# Patient Record
Sex: MALE | Race: OTHER | NOT HISPANIC OR LATINO | URBAN - METROPOLITAN AREA
[De-identification: names, ages, dates, MRNs, and addresses within clinical notes are randomized per-mention and may not be internally consistent; named-entity substitution may affect disease eponyms.]

---

## 2017-11-06 ENCOUNTER — EMERGENCY (EMERGENCY)
Facility: HOSPITAL | Age: 37
LOS: 1 days | Discharge: HOME | End: 2017-11-06
Attending: EMERGENCY MEDICINE | Admitting: EMERGENCY MEDICINE
Payer: MEDICAID

## 2017-11-06 VITALS
DIASTOLIC BLOOD PRESSURE: 69 MMHG | RESPIRATION RATE: 18 BRPM | TEMPERATURE: 99 F | SYSTOLIC BLOOD PRESSURE: 107 MMHG | OXYGEN SATURATION: 99 % | HEART RATE: 61 BPM

## 2017-11-06 VITALS
TEMPERATURE: 97 F | HEART RATE: 75 BPM | SYSTOLIC BLOOD PRESSURE: 124 MMHG | WEIGHT: 119.93 LBS | OXYGEN SATURATION: 100 % | DIASTOLIC BLOOD PRESSURE: 70 MMHG | RESPIRATION RATE: 17 BRPM

## 2017-11-06 DIAGNOSIS — R11.0 NAUSEA: ICD-10-CM

## 2017-11-06 DIAGNOSIS — R10.11 RIGHT UPPER QUADRANT PAIN: ICD-10-CM

## 2017-11-06 DIAGNOSIS — R10.13 EPIGASTRIC PAIN: ICD-10-CM

## 2017-11-06 DIAGNOSIS — E78.5 HYPERLIPIDEMIA, UNSPECIFIED: ICD-10-CM

## 2017-11-06 LAB
ALBUMIN SERPL ELPH-MCNC: 4.5 G/DL — SIGNIFICANT CHANGE UP (ref 3.3–5)
ALP SERPL-CCNC: 39 U/L — LOW (ref 40–120)
ALT FLD-CCNC: 15 U/L — SIGNIFICANT CHANGE UP (ref 10–45)
ANION GAP SERPL CALC-SCNC: 14 MMOL/L — SIGNIFICANT CHANGE UP (ref 5–17)
AST SERPL-CCNC: 18 U/L — SIGNIFICANT CHANGE UP (ref 10–40)
BASOPHILS NFR BLD AUTO: 0.5 % — SIGNIFICANT CHANGE UP (ref 0–2)
BILIRUB SERPL-MCNC: <0.2 MG/DL — SIGNIFICANT CHANGE UP (ref 0.2–1.2)
BUN SERPL-MCNC: 16 MG/DL — SIGNIFICANT CHANGE UP (ref 7–23)
CALCIUM SERPL-MCNC: 9.7 MG/DL — SIGNIFICANT CHANGE UP (ref 8.4–10.5)
CHLORIDE SERPL-SCNC: 99 MMOL/L — SIGNIFICANT CHANGE UP (ref 96–108)
CO2 SERPL-SCNC: 26 MMOL/L — SIGNIFICANT CHANGE UP (ref 22–31)
CREAT SERPL-MCNC: 0.84 MG/DL — SIGNIFICANT CHANGE UP (ref 0.5–1.3)
EOSINOPHIL NFR BLD AUTO: 4.7 % — SIGNIFICANT CHANGE UP (ref 0–6)
GLUCOSE SERPL-MCNC: 104 MG/DL — HIGH (ref 70–99)
HCT VFR BLD CALC: 41.1 % — SIGNIFICANT CHANGE UP (ref 39–50)
HGB BLD-MCNC: 13.7 G/DL — SIGNIFICANT CHANGE UP (ref 13–17)
LIDOCAIN IGE QN: 50 U/L — SIGNIFICANT CHANGE UP (ref 7–60)
LYMPHOCYTES # BLD AUTO: 38 % — SIGNIFICANT CHANGE UP (ref 13–44)
MCHC RBC-ENTMCNC: 29 PG — SIGNIFICANT CHANGE UP (ref 27–34)
MCHC RBC-ENTMCNC: 33.3 G/DL — SIGNIFICANT CHANGE UP (ref 32–36)
MCV RBC AUTO: 86.9 FL — SIGNIFICANT CHANGE UP (ref 80–100)
MONOCYTES NFR BLD AUTO: 9.5 % — SIGNIFICANT CHANGE UP (ref 2–14)
NEUTROPHILS NFR BLD AUTO: 47.3 % — SIGNIFICANT CHANGE UP (ref 43–77)
PLATELET # BLD AUTO: 282 K/UL — SIGNIFICANT CHANGE UP (ref 150–400)
POTASSIUM SERPL-MCNC: 4.2 MMOL/L — SIGNIFICANT CHANGE UP (ref 3.5–5.3)
POTASSIUM SERPL-SCNC: 4.2 MMOL/L — SIGNIFICANT CHANGE UP (ref 3.5–5.3)
PROT SERPL-MCNC: 7.7 G/DL — SIGNIFICANT CHANGE UP (ref 6–8.3)
RBC # BLD: 4.73 M/UL — SIGNIFICANT CHANGE UP (ref 4.2–5.8)
RBC # FLD: 13.7 % — SIGNIFICANT CHANGE UP (ref 10.3–16.9)
SODIUM SERPL-SCNC: 139 MMOL/L — SIGNIFICANT CHANGE UP (ref 135–145)
WBC # BLD: 6.6 K/UL — SIGNIFICANT CHANGE UP (ref 3.8–10.5)
WBC # FLD AUTO: 6.6 K/UL — SIGNIFICANT CHANGE UP (ref 3.8–10.5)

## 2017-11-06 PROCEDURE — 99285 EMERGENCY DEPT VISIT HI MDM: CPT | Mod: 25

## 2017-11-06 PROCEDURE — 96375 TX/PRO/DX INJ NEW DRUG ADDON: CPT

## 2017-11-06 PROCEDURE — 76705 ECHO EXAM OF ABDOMEN: CPT | Mod: 26

## 2017-11-06 PROCEDURE — 74020: CPT | Mod: 26

## 2017-11-06 PROCEDURE — 74020: CPT

## 2017-11-06 PROCEDURE — 36415 COLL VENOUS BLD VENIPUNCTURE: CPT

## 2017-11-06 PROCEDURE — 99284 EMERGENCY DEPT VISIT MOD MDM: CPT | Mod: 25

## 2017-11-06 PROCEDURE — 93005 ELECTROCARDIOGRAM TRACING: CPT

## 2017-11-06 PROCEDURE — 76705 ECHO EXAM OF ABDOMEN: CPT

## 2017-11-06 PROCEDURE — 96374 THER/PROPH/DIAG INJ IV PUSH: CPT

## 2017-11-06 PROCEDURE — 93010 ELECTROCARDIOGRAM REPORT: CPT

## 2017-11-06 PROCEDURE — 83690 ASSAY OF LIPASE: CPT

## 2017-11-06 PROCEDURE — 80053 COMPREHEN METABOLIC PANEL: CPT

## 2017-11-06 PROCEDURE — 85025 COMPLETE CBC W/AUTO DIFF WBC: CPT

## 2017-11-06 RX ORDER — ONDANSETRON 8 MG/1
4 TABLET, FILM COATED ORAL ONCE
Qty: 0 | Refills: 0 | Status: COMPLETED | OUTPATIENT
Start: 2017-11-06 | End: 2017-11-06

## 2017-11-06 RX ORDER — OMEPRAZOLE 10 MG/1
1 CAPSULE, DELAYED RELEASE ORAL
Qty: 30 | Refills: 0
Start: 2017-11-06 | End: 2017-12-06

## 2017-11-06 RX ORDER — FAMOTIDINE 10 MG/ML
20 INJECTION INTRAVENOUS ONCE
Qty: 0 | Refills: 0 | Status: COMPLETED | OUTPATIENT
Start: 2017-11-06 | End: 2017-11-06

## 2017-11-06 RX ORDER — SODIUM CHLORIDE 9 MG/ML
1000 INJECTION INTRAMUSCULAR; INTRAVENOUS; SUBCUTANEOUS ONCE
Qty: 0 | Refills: 0 | Status: COMPLETED | OUTPATIENT
Start: 2017-11-06 | End: 2017-11-06

## 2017-11-06 RX ADMIN — FAMOTIDINE 20 MILLIGRAM(S): 10 INJECTION INTRAVENOUS at 20:00

## 2017-11-06 RX ADMIN — SODIUM CHLORIDE 1000 MILLILITER(S): 9 INJECTION INTRAMUSCULAR; INTRAVENOUS; SUBCUTANEOUS at 20:15

## 2017-11-06 RX ADMIN — Medication 30 MILLILITER(S): at 19:59

## 2017-11-06 RX ADMIN — ONDANSETRON 4 MILLIGRAM(S): 8 TABLET, FILM COATED ORAL at 19:59

## 2017-11-06 NOTE — ED PROVIDER NOTE - PHYSICAL EXAMINATION
VITAL SIGNS: I have reviewed nursing notes and confirm.  CONSTITUTIONAL: Well-developed; well-nourished; in no acute distress.  SKIN: Agree with RN documentation regarding decubitus evaluation. Remainder of skin exam is warm and dry, no acute rash.  HEAD: Normocephalic; atraumatic.  EYES: PERRL, EOM intact; conjunctiva and sclera clear.  ENT: No nasal discharge; airway clear.  NECK: Supple; non tender.  CARD: S1, S2 normal; no murmurs, gallops, or rubs. RRR  RESP: No wheezes, rales or rhonchi. CTA b/l  ABD: Normal bowel sounds; soft; non-distended; non-tender, no mcburney's pt TTP or jade's sign, no guarding/rigidity  EXT: Normal ROM. No clubbing, cyanosis or edema.  LYMPH: No acute cervical adenopathy.  NEURO: Alert, oriented. Grossly unremarkable.  PSYCH: Cooperative, appropriate.

## 2017-11-06 NOTE — ED PROVIDER NOTE - OBJECTIVE STATEMENT
35 y/o M w/HLD p/w intermittent sharp RUQ abd pain on/off for several days made worse with eating. States that pain is mostly epigastric/RUQ that radiates around to the back. + nausea, no vomiting. No CP/SOB/palpitations or cough. No c/d, no urinary sx. Denies hx abd surgery or known biliary pathology. Taking no meds at home for sx management.

## 2017-11-06 NOTE — ED ADULT NURSE NOTE - ADDITIONAL PRINTED INSTRUCTIONS GIVEN
Pt instructed to follow up with gastrologist and PCP within 2-3 days. iF sx worsen to come back to ed.

## 2017-11-06 NOTE — ED ADULT NURSE NOTE - CHPI ED SYMPTOMS NEG
no hematuria/no dysuria/no diarrhea/no abdominal distension/no blood in stool/no fever/no burning urination

## 2017-11-06 NOTE — ED ADULT NURSE REASSESSMENT NOTE - NS ED NURSE REASSESS COMMENT FT1
Pt returned from US. Pt A&Ox3 and denies pain at this time and pending discharge. Will continue to monitor.

## 2017-11-06 NOTE — ED PROVIDER NOTE - MEDICAL DECISION MAKING DETAILS
U/S negative for cholecysitis or cholelithiasis, no evidence of pancreatitis or biliary dz on labs, no leukocytosis or fever suggestive of infection, likely reflux/inflammatory pain which will require GI f/u and endoscopy. Starting on omeprazole, will f/u with both PMD and GI. Given strict return precautions and anticipatory guidance. Abd series showing non-specific gas pattern to rectum.

## 2017-11-06 NOTE — ED ADULT NURSE REASSESSMENT NOTE - NS ED NURSE REASSESS COMMENT FT1
Pt received from ER [facility]. Pt A&Ox3 at this time c.o 5:10 gas pressure in upper abdomen. Pt denies SOB, numbness or tingling to upper and lower extremities at this time. MD notified and pt instructed to pas gas if needed.  Pt pending US at this time and will continue to monitor.

## 2017-11-06 NOTE — ED ADULT NURSE NOTE - OBJECTIVE STATEMENT
Pt presents to ED with c/o sharp intermittent epigastric pain radiating to his back x 10 days and associated w nausea and vomiting, worse w eating. Pt denies fevers/chills/diarrhea or hx of abdom surgeries.

## 2017-12-20 PROBLEM — Z00.00 ENCOUNTER FOR PREVENTIVE HEALTH EXAMINATION: Status: ACTIVE | Noted: 2017-12-20

## 2017-12-21 ENCOUNTER — APPOINTMENT (OUTPATIENT)
Dept: GASTROENTEROLOGY | Facility: CLINIC | Age: 37
End: 2017-12-21
Payer: MEDICAID

## 2017-12-21 VITALS
DIASTOLIC BLOOD PRESSURE: 70 MMHG | BODY MASS INDEX: 22.08 KG/M2 | RESPIRATION RATE: 14 BRPM | HEIGHT: 62 IN | WEIGHT: 120 LBS | HEART RATE: 75 BPM | SYSTOLIC BLOOD PRESSURE: 108 MMHG | OXYGEN SATURATION: 97 % | TEMPERATURE: 98.1 F

## 2017-12-21 DIAGNOSIS — R10.9 UNSPECIFIED ABDOMINAL PAIN: ICD-10-CM

## 2017-12-21 DIAGNOSIS — R14.0 ABDOMINAL DISTENSION (GASEOUS): ICD-10-CM

## 2017-12-21 PROCEDURE — 99205 OFFICE O/P NEW HI 60 MIN: CPT | Mod: 25

## 2017-12-21 PROCEDURE — 36415 COLL VENOUS BLD VENIPUNCTURE: CPT

## 2017-12-21 RX ORDER — LORATADINE 10 MG/1
10 TABLET ORAL
Refills: 0 | Status: ACTIVE | COMMUNITY

## 2017-12-21 RX ORDER — MONTELUKAST SODIUM 10 MG/1
10 TABLET, FILM COATED ORAL
Refills: 0 | Status: ACTIVE | COMMUNITY

## 2017-12-21 RX ORDER — OMEPRAZOLE 20 MG/1
20 CAPSULE, DELAYED RELEASE ORAL
Refills: 0 | Status: ACTIVE | COMMUNITY

## 2017-12-27 LAB
ENDOMYSIUM IGA SER QL: NEGATIVE
ENDOMYSIUM IGA TITR SER: NORMAL
GLIADIN IGA SER QL: <5 UNITS
GLIADIN IGG SER QL: <5 UNITS
GLIADIN PEPTIDE IGA SER-ACNC: NEGATIVE
GLIADIN PEPTIDE IGG SER-ACNC: NEGATIVE
IGA SER QL IEP: 196 MG/DL
TTG IGA SER IA-ACNC: <5 UNITS
TTG IGA SER-ACNC: NEGATIVE
TTG IGG SER IA-ACNC: <5 UNITS
TTG IGG SER IA-ACNC: NEGATIVE

## 2018-01-22 ENCOUNTER — APPOINTMENT (OUTPATIENT)
Dept: GASTROENTEROLOGY | Facility: HOSPITAL | Age: 38
End: 2018-01-22

## 2018-01-26 ENCOUNTER — APPOINTMENT (OUTPATIENT)
Dept: GASTROENTEROLOGY | Facility: CLINIC | Age: 38
End: 2018-01-26

## 2018-02-13 ENCOUNTER — RESULT REVIEW (OUTPATIENT)
Age: 38
End: 2018-02-13

## 2018-02-13 ENCOUNTER — APPOINTMENT (OUTPATIENT)
Dept: GASTROENTEROLOGY | Facility: HOSPITAL | Age: 38
End: 2018-02-13
Payer: MEDICAID

## 2018-02-13 ENCOUNTER — OUTPATIENT (OUTPATIENT)
Dept: OUTPATIENT SERVICES | Facility: HOSPITAL | Age: 38
LOS: 1 days | Discharge: HOME | End: 2018-02-13
Payer: MEDICAID

## 2018-02-13 PROCEDURE — 43239 EGD BIOPSY SINGLE/MULTIPLE: CPT | Mod: 26

## 2018-02-13 PROCEDURE — 88305 TISSUE EXAM BY PATHOLOGIST: CPT

## 2018-02-13 PROCEDURE — 88341 IMHCHEM/IMCYTCHM EA ADD ANTB: CPT

## 2018-02-13 PROCEDURE — 43239 EGD BIOPSY SINGLE/MULTIPLE: CPT

## 2018-02-14 LAB — SURGICAL PATHOLOGY STUDY: SIGNIFICANT CHANGE UP

## 2018-02-22 ENCOUNTER — APPOINTMENT (OUTPATIENT)
Dept: GASTROENTEROLOGY | Facility: CLINIC | Age: 38
End: 2018-02-22

## 2018-03-12 ENCOUNTER — APPOINTMENT (OUTPATIENT)
Dept: GASTROENTEROLOGY | Facility: CLINIC | Age: 38
End: 2018-03-12

## 2018-03-16 ENCOUNTER — APPOINTMENT (OUTPATIENT)
Dept: GASTROENTEROLOGY | Facility: CLINIC | Age: 38
End: 2018-03-16

## 2019-06-17 ENCOUNTER — EMERGENCY (EMERGENCY)
Facility: HOSPITAL | Age: 39
LOS: 1 days | Discharge: HOME | End: 2019-06-17
Attending: EMERGENCY MEDICINE | Admitting: EMERGENCY MEDICINE
Payer: COMMERCIAL

## 2019-06-17 VITALS
HEART RATE: 88 BPM | SYSTOLIC BLOOD PRESSURE: 108 MMHG | TEMPERATURE: 98 F | DIASTOLIC BLOOD PRESSURE: 67 MMHG | OXYGEN SATURATION: 96 % | RESPIRATION RATE: 17 BRPM

## 2019-06-17 VITALS
RESPIRATION RATE: 16 BRPM | OXYGEN SATURATION: 99 % | SYSTOLIC BLOOD PRESSURE: 120 MMHG | TEMPERATURE: 98 F | HEART RATE: 72 BPM | DIASTOLIC BLOOD PRESSURE: 69 MMHG

## 2019-06-17 DIAGNOSIS — R50.9 FEVER, UNSPECIFIED: ICD-10-CM

## 2019-06-17 DIAGNOSIS — J18.1 LOBAR PNEUMONIA, UNSPECIFIED ORGANISM: ICD-10-CM

## 2019-06-17 LAB
ALBUMIN SERPL ELPH-MCNC: 4.2 G/DL — SIGNIFICANT CHANGE UP (ref 3.3–5)
ALP SERPL-CCNC: 43 U/L — SIGNIFICANT CHANGE UP (ref 40–120)
ALT FLD-CCNC: 18 U/L — SIGNIFICANT CHANGE UP (ref 10–45)
ANION GAP SERPL CALC-SCNC: 11 MMOL/L — SIGNIFICANT CHANGE UP (ref 5–17)
APPEARANCE UR: CLEAR — SIGNIFICANT CHANGE UP
AST SERPL-CCNC: 18 U/L — SIGNIFICANT CHANGE UP (ref 10–40)
BASOPHILS # BLD AUTO: 0.04 K/UL — SIGNIFICANT CHANGE UP (ref 0–0.2)
BASOPHILS NFR BLD AUTO: 0.5 % — SIGNIFICANT CHANGE UP (ref 0–2)
BILIRUB SERPL-MCNC: 0.2 MG/DL — SIGNIFICANT CHANGE UP (ref 0.2–1.2)
BILIRUB UR-MCNC: NEGATIVE — SIGNIFICANT CHANGE UP
BUN SERPL-MCNC: 14 MG/DL — SIGNIFICANT CHANGE UP (ref 7–23)
CALCIUM SERPL-MCNC: 9.2 MG/DL — SIGNIFICANT CHANGE UP (ref 8.4–10.5)
CHLORIDE SERPL-SCNC: 105 MMOL/L — SIGNIFICANT CHANGE UP (ref 96–108)
CO2 SERPL-SCNC: 27 MMOL/L — SIGNIFICANT CHANGE UP (ref 22–31)
COLOR SPEC: YELLOW — SIGNIFICANT CHANGE UP
CREAT SERPL-MCNC: 0.9 MG/DL — SIGNIFICANT CHANGE UP (ref 0.5–1.3)
DIFF PNL FLD: ABNORMAL
EOSINOPHIL # BLD AUTO: 0.25 K/UL — SIGNIFICANT CHANGE UP (ref 0–0.5)
EOSINOPHIL NFR BLD AUTO: 2.9 % — SIGNIFICANT CHANGE UP (ref 0–6)
GLUCOSE SERPL-MCNC: 102 MG/DL — HIGH (ref 70–99)
GLUCOSE UR QL: NEGATIVE — SIGNIFICANT CHANGE UP
HCT VFR BLD CALC: 38.4 % — LOW (ref 39–50)
HGB BLD-MCNC: 12.4 G/DL — LOW (ref 13–17)
IMM GRANULOCYTES NFR BLD AUTO: 0.2 % — SIGNIFICANT CHANGE UP (ref 0–1.5)
KETONES UR-MCNC: NEGATIVE — SIGNIFICANT CHANGE UP
LEUKOCYTE ESTERASE UR-ACNC: NEGATIVE — SIGNIFICANT CHANGE UP
LYMPHOCYTES # BLD AUTO: 2.06 K/UL — SIGNIFICANT CHANGE UP (ref 1–3.3)
LYMPHOCYTES # BLD AUTO: 24.1 % — SIGNIFICANT CHANGE UP (ref 13–44)
MCHC RBC-ENTMCNC: 29.3 PG — SIGNIFICANT CHANGE UP (ref 27–34)
MCHC RBC-ENTMCNC: 32.3 GM/DL — SIGNIFICANT CHANGE UP (ref 32–36)
MCV RBC AUTO: 90.8 FL — SIGNIFICANT CHANGE UP (ref 80–100)
MONOCYTES # BLD AUTO: 0.94 K/UL — HIGH (ref 0–0.9)
MONOCYTES NFR BLD AUTO: 11 % — SIGNIFICANT CHANGE UP (ref 2–14)
NEUTROPHILS # BLD AUTO: 5.23 K/UL — SIGNIFICANT CHANGE UP (ref 1.8–7.4)
NEUTROPHILS NFR BLD AUTO: 61.3 % — SIGNIFICANT CHANGE UP (ref 43–77)
NITRITE UR-MCNC: NEGATIVE — SIGNIFICANT CHANGE UP
NRBC # BLD: 0 /100 WBCS — SIGNIFICANT CHANGE UP (ref 0–0)
PH UR: 6 — SIGNIFICANT CHANGE UP (ref 5–8)
PLATELET # BLD AUTO: 239 K/UL — SIGNIFICANT CHANGE UP (ref 150–400)
POTASSIUM SERPL-MCNC: 4.1 MMOL/L — SIGNIFICANT CHANGE UP (ref 3.5–5.3)
POTASSIUM SERPL-SCNC: 4.1 MMOL/L — SIGNIFICANT CHANGE UP (ref 3.5–5.3)
PROT SERPL-MCNC: 7.2 G/DL — SIGNIFICANT CHANGE UP (ref 6–8.3)
PROT UR-MCNC: NEGATIVE MG/DL — SIGNIFICANT CHANGE UP
RBC # BLD: 4.23 M/UL — SIGNIFICANT CHANGE UP (ref 4.2–5.8)
RBC # FLD: 12.6 % — SIGNIFICANT CHANGE UP (ref 10.3–14.5)
SODIUM SERPL-SCNC: 143 MMOL/L — SIGNIFICANT CHANGE UP (ref 135–145)
SP GR SPEC: 1.02 — SIGNIFICANT CHANGE UP (ref 1–1.03)
UROBILINOGEN FLD QL: 0.2 E.U./DL — SIGNIFICANT CHANGE UP
WBC # BLD: 8.54 K/UL — SIGNIFICANT CHANGE UP (ref 3.8–10.5)
WBC # FLD AUTO: 8.54 K/UL — SIGNIFICANT CHANGE UP (ref 3.8–10.5)

## 2019-06-17 PROCEDURE — 80053 COMPREHEN METABOLIC PANEL: CPT

## 2019-06-17 PROCEDURE — 99283 EMERGENCY DEPT VISIT LOW MDM: CPT

## 2019-06-17 PROCEDURE — 99284 EMERGENCY DEPT VISIT MOD MDM: CPT | Mod: 25

## 2019-06-17 PROCEDURE — 71046 X-RAY EXAM CHEST 2 VIEWS: CPT

## 2019-06-17 PROCEDURE — 85025 COMPLETE CBC W/AUTO DIFF WBC: CPT

## 2019-06-17 PROCEDURE — 36415 COLL VENOUS BLD VENIPUNCTURE: CPT

## 2019-06-17 PROCEDURE — 81001 URINALYSIS AUTO W/SCOPE: CPT

## 2019-06-17 PROCEDURE — 71046 X-RAY EXAM CHEST 2 VIEWS: CPT | Mod: 26

## 2019-06-17 RX ORDER — ACETAMINOPHEN 500 MG
650 TABLET ORAL ONCE
Refills: 0 | Status: COMPLETED | OUTPATIENT
Start: 2019-06-17 | End: 2019-06-17

## 2019-06-17 RX ADMIN — Medication 650 MILLIGRAM(S): at 09:17

## 2019-06-17 NOTE — ED PROVIDER NOTE - DIAGNOSTIC INTERPRETATION
ER PA: Niki Gordon- reviewed with radiology  CHEST XRAY INTERPRETATION: right lower lobe infiltrate, heart shadow normal, bony structures intact

## 2019-06-17 NOTE — ED PROVIDER NOTE - NSFOLLOWUPINSTRUCTIONS_ED_ALL_ED_FT
please take antibiotics as prescribed    please follow up with your primary care doctor for re-evaluation      PNEUMONIA - AfterCare(R) Instructions(ER/ED)     Pneumonia    WHAT YOU NEED TO KNOW:    Pneumonia is an infection in your lungs caused by bacteria, viruses, fungi, or parasites. You can become infected if you come in contact with someone who is sick. You can get pneumonia if you recently had surgery or needed a ventilator to help you breathe. Pneumonia can also be caused by accidentally inhaling saliva or small pieces of food. Pneumonia may cause mild symptoms, or it can be severe and life-threatening. The Lungs         DISCHARGE INSTRUCTIONS:    Return to the emergency department if:     You cough up blood.       Your heart beats more than 100 beats in 1 minute.       You are very tired, confused, and cannot think clearly.      You have chest pain or trouble breathing.       Your lips or fingernails turn gray or blue.     Contact your healthcare provider if:     Your symptoms are the same or get worse 48 hours after you start antibiotics.      Your fever is not below 99°F (37.2°C) 48 hours after you start antibiotics.       You have a fever higher than 101°F (38.3°C).       You cannot eat, or you have loss of appetite, nausea, or are vomiting.      You have questions or concerns about your condition or care.    Medicines:     Antibiotics treat pneumonia caused by bacteria.      Acetaminophen decreases pain and fever. It is available without a doctor's order. Ask how much to take and how often to take it. Follow directions. Read the labels of all other medicines you are using to see if they also contain acetaminophen, or ask your doctor or pharmacist. Acetaminophen can cause liver damage if not taken correctly. Do not use more than 4 grams (4,000 milligrams) total of acetaminophen in one day.       NSAIDs, such as ibuprofen, help decrease swelling, pain, and fever. This medicine is available with or without a doctor's order. NSAIDs can cause stomach bleeding or kidney problems in certain people. If you take blood thinner medicine, always ask your healthcare provider if NSAIDs are safe for you. Always read the medicine label and follow directions.      Take your medicine as directed. Contact your healthcare provider if you think your medicine is not helping or if you have side effects. Tell him or her if you are allergic to any medicine. Keep a list of the medicines, vitamins, and herbs you take. Include the amounts, and when and why you take them. Bring the list or the pill bottles to follow-up visits. Carry your medicine list with you in case of an emergency.    Follow up with your healthcare provider as directed: You will need to return for more tests. Write down your questions so you remember to ask them during your visits.     Manage your symptoms:     Rest as needed. Rest often throughout the day. Alternate times of activity with times of rest.      Drink liquids as directed. Ask how much liquid to drink each day and which liquids are best for you. Liquids help thin your mucus, which may make it easier for you to cough it up.       Do not smoke. Avoid secondhand smoke. Smoking increases your risk for pneumonia. Smoking also makes it harder for you to get better after you have had pneumonia. Ask your healthcare provider for information if you need help to quit smoking.       Use a cool mist humidifier. A humidifier will help increase air moisture in your home. This may make it easier for you to breathe and help decrease your cough.       Keep your head elevated. You may be able to breathe better if you lie down with the head of your bed up.     Prevent pneumonia:     Prevent the spread of germs. Wash your hands often with soap and water. Use gel hand cleanser when there is no soap and water available. Do not touch your eyes, nose, or mouth unless you have washed your hands first. Cover your mouth when you cough. Cough into a tissue or your shirtsleeve so you do not spread germs from your hands. If you are sick, stay away from others as much as possible. Handwashing           Limit alcohol. Women should limit alcohol to 1 drink a day. Men should limit alcohol to 2 drinks a day. A drink of alcohol is 12 ounces of beer, 5 ounces of wine, or 1½ ounces of liquor.      Ask about vaccines. You may need a vaccine to help prevent pneumonia. Get an influenza (flu) vaccine every year as soon as it becomes available.          © Copyright [employer] 2019 All illustrations and images included in CareNotes are the copyrighted property of [employer] or [employer].      back to top                      © Copyright [employer] 2019 please take antibiotics as prescribed    continue tylenol and motrin as needed for fever    please follow up with your primary care doctor for re-evaluation/chest xray next week      Pneumonia    WHAT YOU NEED TO KNOW:    Pneumonia is an infection in your lungs caused by bacteria, viruses, fungi, or parasites. You can become infected if you come in contact with someone who is sick. You can get pneumonia if you recently had surgery or needed a ventilator to help you breathe. Pneumonia can also be caused by accidentally inhaling saliva or small pieces of food. Pneumonia may cause mild symptoms, or it can be severe and life-threatening. The Lungs         DISCHARGE INSTRUCTIONS:    Return to the emergency department if:     You cough up blood.       Your heart beats more than 100 beats in 1 minute.       You are very tired, confused, and cannot think clearly.      You have chest pain or trouble breathing.       Your lips or fingernails turn gray or blue.     Contact your healthcare provider if:     Your symptoms are the same or get worse 48 hours after you start antibiotics.      Your fever is not below 99°F (37.2°C) 48 hours after you start antibiotics.       You have a fever higher than 101°F (38.3°C).       You cannot eat, or you have loss of appetite, nausea, or are vomiting.      You have questions or concerns about your condition or care.    Medicines:     Antibiotics treat pneumonia caused by bacteria.      Acetaminophen decreases pain and fever. It is available without a doctor's order. Ask how much to take and how often to take it. Follow directions. Read the labels of all other medicines you are using to see if they also contain acetaminophen, or ask your doctor or pharmacist. Acetaminophen can cause liver damage if not taken correctly. Do not use more than 4 grams (4,000 milligrams) total of acetaminophen in one day.       NSAIDs, such as ibuprofen, help decrease swelling, pain, and fever. This medicine is available with or without a doctor's order. NSAIDs can cause stomach bleeding or kidney problems in certain people. If you take blood thinner medicine, always ask your healthcare provider if NSAIDs are safe for you. Always read the medicine label and follow directions.      Take your medicine as directed. Contact your healthcare provider if you think your medicine is not helping or if you have side effects. Tell him or her if you are allergic to any medicine. Keep a list of the medicines, vitamins, and herbs you take. Include the amounts, and when and why you take them. Bring the list or the pill bottles to follow-up visits. Carry your medicine list with you in case of an emergency.    Follow up with your healthcare provider as directed: You will need to return for more tests. Write down your questions so you remember to ask them during your visits.     Manage your symptoms:     Rest as needed. Rest often throughout the day. Alternate times of activity with times of rest.      Drink liquids as directed. Ask how much liquid to drink each day and which liquids are best for you. Liquids help thin your mucus, which may make it easier for you to cough it up.       Do not smoke. Avoid secondhand smoke. Smoking increases your risk for pneumonia. Smoking also makes it harder for you to get better after you have had pneumonia. Ask your healthcare provider for information if you need help to quit smoking.       Use a cool mist humidifier. A humidifier will help increase air moisture in your home. This may make it easier for you to breathe and help decrease your cough.       Keep your head elevated. You may be able to breathe better if you lie down with the head of your bed up.     Prevent pneumonia:     Prevent the spread of germs. Wash your hands often with soap and water. Use gel hand cleanser when there is no soap and water available. Do not touch your eyes, nose, or mouth unless you have washed your hands first. Cover your mouth when you cough. Cough into a tissue or your shirtsleeve so you do not spread germs from your hands. If you are sick, stay away from others as much as possible. Handwashing           Limit alcohol. Women should limit alcohol to 1 drink a day. Men should limit alcohol to 2 drinks a day. A drink of alcohol is 12 ounces of beer, 5 ounces of wine, or 1½ ounces of liquor.      Ask about vaccines. You may need a vaccine to help prevent pneumonia. Get an influenza (flu) vaccine every year as soon as it becomes available.          © Copyright [employer] 2019 All illustrations and images included in CareNotes are the copyrighted property of [employer] or [employer].      back to top                      © Copyright [employer] 2019

## 2019-06-17 NOTE — ED PROVIDER NOTE - CLINICAL SUMMARY MEDICAL DECISION MAKING FREE TEXT BOX
38 year old male presents to the ed with tactile fevers in the morning in evening since last Tuesday with a cough. physical exam patient appears well, non-toxic, afebrile. lungs cta. abdomen is soft. labs reviewed and patient sent for xray. 38 year old male presents to the ed with tactile fevers in the morning in evening since last Tuesday with a cough. physical exam patient appears well, non-toxic, afebrile. lungs cta. abdomen is soft. labs reviewed and patient sent for xray which shows right lower opacity. patient treated for pneumonia. discussed with patient that if symptoms persist or do not improve encourage follow up with pmd, cannot exclude mass per radiology. At this time, the evidence for any other entities in the differential is insufficient to justify any further testing. This was discussed and explained to the patient. It was advised that persistent or worsening symptoms would require further evaluation. This was discussed with the patient and family using shared decision making. ED evaluation and management discussed with the patient and family (if available) in detail.  Close PMD follow up encouraged.  Strict ED return instructions discussed in detail and patient given the opportunity to ask any questions about their discharge diagnosis and instructions. Patient verbalized understanding. Patient is agreeable to plan. 38 year old male presents to the ed with tactile fevers in the morning in evening since last Tuesday with a cough. physical exam patient appears well, non-toxic, afebrile. lungs cta. abdomen is soft. labs reviewed and patient sent for xray which shows right lower opacity, which per radiology presume pneumonia, unable to r/o malignancy. patient treated for pneumonia. discussed with patient that if symptoms persist or do not improve encourage follow up with pmd, cannot exclude mass per radiology. At this time, the evidence for any other entities in the differential is insufficient to justify any further testing. This was discussed and explained to the patient. It was advised that persistent or worsening symptoms would require further evaluation. This was discussed with the patient and family using shared decision making. ED evaluation and management discussed with the patient and family (if available) in detail.  Close PMD follow up encouraged.  Strict ED return instructions discussed in detail and patient given the opportunity to ask any questions about their discharge diagnosis and instructions. Patient verbalized understanding. Patient is agreeable to plan.

## 2019-06-17 NOTE — ED PROVIDER NOTE - PHYSICAL EXAMINATION
General: Patient is well developed and well nourised. Patient is alert and oriented to person, place and date. Patient is laying comfortably in stretcher and appears in no acute distress.  HEENT: Head is normocephalic and atraumatic. Pupils are equal, round and reactive. Extraocular movements intact. No evidence of nystagmus, conjunctival injection, or scleral icterus. External ears symmetric without evidence of discharge.  Nose is symmetric, non-tender, patent without evidence of discharge. Teeth in good repair. Uvula midline.   Neck: Supple with no evidence of lymphadenopathy.  Full range of motion.  Heart: Regular rate and rhythm. No murmurs, rubs or gallops.   Lungs: Clear to auscultation bilaterally with equal chest expansion. No note of wheezes, rhonchi, rales. Equal chest expansion. No note of retractions.  Abdomen: Bowel sounds present in all four quadrants. Soft, non-tender, non-distended without signs of masses, rebound or guarding. No note of hepatosplenomegaly. No CVA tenderness bilaterally. Negative Da Silva sign. No pain present over McBurney's point.  Neuro: GCS 15. Moving all extremities without discomfort. Sgait steady   Skin: Warm, dry and intact without evidence of rashes, bruising, pallor, jaundice or cyanosis.   Psych: Mood and affect appropriate. General: Patient is well developed and well nourished. Patient is alert and oriented to person, place and date. Patient is laying comfortably in stretcher and appears in no acute distress.  HEENT: Head is normocephalic and atraumatic. Pupils are equal, round and reactive. Extraocular movements intact. No evidence of nystagmus, conjunctival injection, or scleral icterus. External ears symmetric without evidence of discharge.  Nose is symmetric, non-tender, patent without evidence of discharge. Teeth in good repair. Uvula midline.   Neck: Supple with no evidence of lymphadenopathy.  Full range of motion.  Heart: Regular rate and rhythm. No murmurs, rubs or gallops.   Lungs: Clear to auscultation bilaterally with equal chest expansion. No note of wheezes, rhonchi, rales. Equal chest expansion. No note of retractions.  Abdomen: Bowel sounds present in all four quadrants. Soft, non-tender, non-distended without signs of masses, rebound or guarding. No note of hepatosplenomegaly. No CVA tenderness bilaterally. Negative Da Silva sign. No pain present over McBurney's point.  Neuro: GCS 15. Moving all extremities without discomfort. gait steady   Skin: Warm, dry and intact without evidence of rashes, bruising, pallor, jaundice or cyanosis.   Psych: Mood and affect appropriate.

## 2019-06-17 NOTE — ED PROVIDER NOTE - CARE PLAN
Principal Discharge DX:	Viral syndrome Principal Discharge DX:	Pneumonia of right lower lobe due to infectious organism

## 2019-06-17 NOTE — ED PROVIDER NOTE - OBJECTIVE STATEMENT
states that it began last Tuesday. states that in the morning "I feel cold and chilled and shiver and it gets better and then it happens at night". states that he has not been taking his temperature. states that he has been taking ibuprofen and tylenol without relief of symptoms. states that he has a "small cough", without chest pain palpitations cough or shortness of breath. states that his daughter is beginning to have similar symptoms. states that it began last Tuesday. states that in the morning "I feel cold and chilled and shiver and it gets better and then it happens at night". states that he has not been taking his temperature. states that he has been taking ibuprofen and tylenol without relief of symptoms. states that he has a "small cough", without chest pain palpitations cough or shortness of breath. states that his daughter is beginning to have similar symptoms. states that he has a mild headache "that I always have". no numbness tingling weakness, change in vision blurred vision double vision.

## 2019-06-17 NOTE — ED PROVIDER NOTE - ATTENDING CONTRIBUTION TO CARE
Pt is a 39yo m, no pmh, who p/w 1 wk of tactile fevers, chills in am and recurs at night. + mild dry cough. No n/v/d, cp, sob, abd pain, urinary sx's, flank pain.     VITAL SIGNS: I have reviewed nursing notes and confirm.  CONSTITUTIONAL: Well-developed; well-nourished; in no acute distress.   SKIN:  warm and dry, no acute rash.   HEAD:  normocephalic, atraumatic.  EYES: EOM intact; conjunctiva and sclera clear.  ENT: No nasal discharge; airway clear.   NECK: Supple; non tender.  CARD: S1, S2 normal; no murmurs, gallops, or rubs. Regular rate and rhythm.   RESP:  Clear to auscultation b/l, no wheezes, rales or rhonchi.  ABD: Normal bowel sounds; soft; non-distended; non-tender; no guarding/ rebound.  EXT: Normal ROM. No clubbing, cyanosis or edema. 2+ pulses to b/l ue/le.  NEURO: Alert, oriented, grossly unremarkable  PSYCH: Cooperative, mood and affect appropriate.    Labs reviewed and wnl. CXR with opacity to r lung periphery, ? infiltrate vs. mass. Will tx for pna and pt advised on f/u with pcp for re-evaluation and further w/u. Pt is a 37yo m, no pmh, who p/w 1 wk of tactile fevers, chills in am and recurs at night. + mild dry cough. No n/v/d, cp, sob, abd pain, urinary sx's, flank pain.     VITAL SIGNS: I have reviewed nursing notes and confirm.  CONSTITUTIONAL: Well-developed; well-nourished; in no acute distress.   SKIN:  warm and dry, no acute rash.   HEAD:  normocephalic, atraumatic.  EYES: EOM intact; conjunctiva and sclera clear.  ENT: No nasal discharge; airway clear.   NECK: Supple; non tender.  CARD: S1, S2 normal; no murmurs, gallops, or rubs. Regular rate and rhythm.   RESP:  Clear to auscultation b/l, no wheezes, rales or rhonchi.  ABD: Normal bowel sounds; soft; non-distended; non-tender; no guarding/ rebound.  EXT: Normal ROM. No clubbing, cyanosis or edema. 2+ pulses to b/l ue/le.  NEURO: Alert, oriented, grossly unremarkable  PSYCH: Cooperative, mood and affect appropriate.       Labs reviewed and wnl. CXR with opacity to r lung periphery, ? infiltrate vs. mass. Will tx for pna and pt advised on f/u with pcp for re-evaluation and further w/u.

## 2019-08-14 ENCOUNTER — APPOINTMENT (OUTPATIENT)
Dept: ENDOCRINOLOGY | Facility: CLINIC | Age: 39
End: 2019-08-14

## 2019-09-18 ENCOUNTER — APPOINTMENT (OUTPATIENT)
Dept: ENDOCRINOLOGY | Facility: CLINIC | Age: 39
End: 2019-09-18

## 2019-10-21 ENCOUNTER — APPOINTMENT (OUTPATIENT)
Dept: ENDOCRINOLOGY | Facility: CLINIC | Age: 39
End: 2019-10-21
Payer: MEDICAID

## 2019-10-21 ENCOUNTER — LABORATORY RESULT (OUTPATIENT)
Age: 39
End: 2019-10-21

## 2019-10-21 VITALS
SYSTOLIC BLOOD PRESSURE: 114 MMHG | WEIGHT: 122 LBS | HEIGHT: 62 IN | HEART RATE: 85 BPM | BODY MASS INDEX: 22.45 KG/M2 | DIASTOLIC BLOOD PRESSURE: 71 MMHG

## 2019-10-21 DIAGNOSIS — R79.89 OTHER SPECIFIED ABNORMAL FINDINGS OF BLOOD CHEMISTRY: ICD-10-CM

## 2019-10-21 DIAGNOSIS — F17.200 NICOTINE DEPENDENCE, UNSPECIFIED, UNCOMPLICATED: ICD-10-CM

## 2019-10-21 PROCEDURE — 99203 OFFICE O/P NEW LOW 30 MIN: CPT

## 2019-10-24 LAB
ACTH SER-ACNC: 22.1 PG/ML
CORTIS SERPL-MCNC: 5.3 UG/DL
FSH SERPL-MCNC: 3.9 IU/L
IGF-1 INTERP: NORMAL
IGF-I BLD-MCNC: 146 NG/ML
LH SERPL-ACNC: 4.2 IU/L
PROLACTIN SERPL-MCNC: 12.3 NG/ML
T3 SERPL-MCNC: 164 NG/DL
T4 FREE SERPL-MCNC: 1.3 NG/DL
TESTOST BND SERPL-MCNC: 8.5 PG/ML
TESTOST SERPL-MCNC: 336 NG/DL
THYROGLOB AB SERPL-ACNC: <20 IU/ML
THYROGLOB SERPL-MCNC: 42.7 NG/ML
TSH SERPL-ACNC: <0.01 UIU/ML
TSI ACT/NOR SER: 10.4 IU/L

## 2019-11-04 ENCOUNTER — OUTPATIENT (OUTPATIENT)
Dept: OUTPATIENT SERVICES | Facility: HOSPITAL | Age: 39
LOS: 1 days | End: 2019-11-04
Payer: COMMERCIAL

## 2019-11-05 PROCEDURE — 78014 THYROID IMAGING W/BLOOD FLOW: CPT

## 2019-11-05 PROCEDURE — 78014 THYROID IMAGING W/BLOOD FLOW: CPT | Mod: 26

## 2019-11-05 PROCEDURE — A9516: CPT

## 2019-11-06 NOTE — ASSESSMENT
[FreeTextEntry1] : 38 year old male with no pertinent medical history presents with suppressed TSH and normal FT4, TT4, and T3 uptake, though T3 uptake high normal. No overt symptoms of hyperthyroidism and HR normal. No thyroid gland enlargement or nodules palpated. Visual fields intact thou unilateral vision deficiency unusual. Differentials include subclinical hyperthyroidism and central hypothyroidism.\par Repeat TFTs and get thyroid Abs. Further evaluate pituitary hormones. Pending results might need further evaluation with pituitary MRI or DUARTE uptake and scan.\par RTO Dr Hoffman in few weeks pending w/u.

## 2019-11-06 NOTE — ADDENDUM
[FreeTextEntry1] : 10/24/19 [place]\par LM to discuss lab results and plan. TSH suppressed. T3 and FT4 normal. TSI 10.4. Pituitary hormones normal.\par Most likely mild Graves disease. Recommend uptake and scan for definitive diagnosis.\par Could explain mild weight loss, fatigue, and increase diaphoresis.\par If Graves, could treat with methimazole 5mg 4x/week, or no meds and repeat TFTs in 3 months or if he becomes symptomatic.\par [place] Fax: [phone]\par [place] Phone: [phone]\par \par 11/6/19 [place]\par NM uptake and scan completed. 29% uptake. Per report could represent normal thyroid or recovering subacute thyroiditis. Has appt with Dr Hoffman on 11/13 to discuss. \par 10/29 [place]\par Discussed lab results and uptake and scan with pt via phone. Sent email to Francoise and faxed req. to [place] dept. [place] approved [id number] thru Dec 13, 2019.

## 2019-11-06 NOTE — PHYSICAL EXAM
[Alert] : alert [Well Nourished] : well nourished [No Acute Distress] : no acute distress [Well Developed] : well developed [Healthy Appearance] : healthy appearance [Normal Voice/Communication] : normal voice communication [Normal Sclera/Conjunctiva] : normal sclera/conjunctiva [No Proptosis] : no proptosis [EOMI] : extra ocular movement intact [Visual Fields Grossly Intact] : visual fields grossly intact [No Lid Lag] : no lid lag [Normal Oropharynx] : the oropharynx was normal [No Neck Mass] : no neck mass was observed [Thyroid Not Enlarged] : the thyroid was not enlarged [No Respiratory Distress] : no respiratory distress [Normal Rate and Effort] : normal respiratory rhythm and effort [No Thyroid Nodules] : there were no palpable thyroid nodules [Clear to Auscultation] : lungs were clear to auscultation bilaterally [No Accessory Muscle Use] : no accessory muscle use [Normal PMI] : the apical impulse was normal [Normal S1, S2] : normal S1 and S2 [Normal Rate] : heart rate was normal  [No Rubs] : no pericardial rub [Regular Rhythm] : with a regular rhythm [Murmurs] : no murmurs [Normal Bowel Sounds] : normal bowel sounds [No Edema] : there was no peripheral edema [Soft] : abdomen soft [Not Tender] : non-tender [Not Distended] : not distended [Post Cervical Nodes] : posterior cervical nodes [Anterior Cervical Nodes] : anterior cervical nodes [Axillary Nodes] : axillary nodes [Normal] : normal and non tender [No Spinal Tenderness] : no spinal tenderness [Spine Straight] : spine straight [No Stigmata of Cushings Syndrome] : no stigmata of cushings syndrome [Normal Gait] : normal gait [Normal Strength/Tone] : muscle strength and tone were normal [No Involuntary Movements] : no involuntary movements were seen [No Rash] : no rash [No Skin Lesions] : no skin lesions [Normal Reflexes] : deep tendon reflexes were 2+ and symmetric [No Tremors] : no tremors [Normal Insight/Judgement] : insight and judgment were intact [Oriented x3] : oriented to person, place, and time [Normal Affect] : the affect was normal [Normal Mood] : the mood was normal [Acanthosis Nigricans] : no acanthosis nigricans

## 2019-11-06 NOTE — HISTORY OF PRESENT ILLNESS
[FreeTextEntry1] : 38 year old male with PMH of seasonal allergies presents with suppressed TSH from PCP.\par July 2019: TSH <0.01, free T4 2.7, T3 uptake 33, total T4 8.3. Vitamin D deficiency, started on supplements. Triglycerides slightly elevated. A1C 5.6%. Otherwise labs normal.\par \par He mostly feels well, but reports fatigue, occasional mild headaches, 2-3lbs weight loss over past 6 months, increased chest diaphoresis with activity, chronic sleeping issues, and left sided vision problems corrected with glasses. He denies palpations, N/V/D, tremors, anxiety, throat swelling, hoarseness or difficulty swallowing. 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 \par FHx: Thinks is sister had hypothyroidism.

## 2019-11-13 ENCOUNTER — APPOINTMENT (OUTPATIENT)
Dept: ENDOCRINOLOGY | Facility: CLINIC | Age: 39
End: 2019-11-13
Payer: MEDICAID

## 2019-11-13 VITALS
WEIGHT: 121 LBS | SYSTOLIC BLOOD PRESSURE: 105 MMHG | BODY MASS INDEX: 22.13 KG/M2 | HEART RATE: 69 BPM | DIASTOLIC BLOOD PRESSURE: 83 MMHG

## 2019-11-13 PROCEDURE — 99214 OFFICE O/P EST MOD 30 MIN: CPT | Mod: GC

## 2020-02-03 ENCOUNTER — APPOINTMENT (OUTPATIENT)
Dept: ENDOCRINOLOGY | Facility: CLINIC | Age: 40
End: 2020-02-03
Payer: MEDICAID

## 2020-02-03 VITALS
HEART RATE: 77 BPM | SYSTOLIC BLOOD PRESSURE: 102 MMHG | WEIGHT: 118 LBS | BODY MASS INDEX: 21.58 KG/M2 | DIASTOLIC BLOOD PRESSURE: 62 MMHG

## 2020-02-03 DIAGNOSIS — E05.00 THYROTOXICOSIS WITH DIFFUSE GOITER W/OUT THYROTOXIC CRISIS OR STORM: ICD-10-CM

## 2020-02-03 LAB
ACTH SER-ACNC: 32.9 PG/ML
CORTIS SERPL-MCNC: 12.1 UG/DL
T3 SERPL-MCNC: 178 NG/DL
T4 FREE SERPL-MCNC: 1.6 NG/DL
TSH SERPL-ACNC: <0.01 UIU/ML

## 2020-02-03 PROCEDURE — 99213 OFFICE O/P EST LOW 20 MIN: CPT

## 2020-02-03 NOTE — ASSESSMENT
[FreeTextEntry1] : 38 year old male with no pertinent medical history presents with suppressed TSH and normal FT4, TT4, and T3 uptake, though T3 uptake high normal. \par \par \par 1. Mild Grave's disease\par - No overt symptoms of hyperthyroidism and HR normal. No thyroid gland enlargement or nodules palpated. \par - TSH remains suppressed, but can take longer to equalize. FT4 and TT3 trending up, but still in normal range.\par   July 2019: TSH <0.01, free T4 2.7, T3 uptake 33, total T4 8.3. \par   Oct 2019: TSH <0.01, free T4 1.3, Total T3 164, TPO + 76.7 and TSI positive 10.40. \par   Jan 2020 TSH <0.01, FT4 1.6, TT3 178.\par - RAIU scan done showed uptake of 29% ) normal gland vs subacute thyroiditis\par - No need for any treatment as of now. Will repeat TFTs in one month to ensure Ft4 and TT3 do not continue to   increase.\par - Repeat TFTs in 1 months. Lab req provided. \par \par 2. Low cortisol level\par - Repeat cortisol and ACTH normal. Cortisol 5.3 (mild low) at 10:45 AM in Oct 2019. 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

## 2020-02-03 NOTE — PHYSICAL EXAM
[Alert] : alert [Well Nourished] : well nourished [No Acute Distress] : no acute distress [Normal Voice/Communication] : normal voice communication [Healthy Appearance] : healthy appearance [Well Developed] : well developed [Normal Sclera/Conjunctiva] : normal sclera/conjunctiva [EOMI] : extra ocular movement intact [No Proptosis] : no proptosis [Visual Fields Grossly Intact] : visual fields grossly intact [No Lid Lag] : no lid lag [Normal Oropharynx] : the oropharynx was normal [No Neck Mass] : no neck mass was observed [Thyroid Not Enlarged] : the thyroid was not enlarged [No Thyroid Nodules] : there were no palpable thyroid nodules [No Respiratory Distress] : no respiratory distress [Normal Rate and Effort] : normal respiratory rhythm and effort [No Accessory Muscle Use] : no accessory muscle use [Clear to Auscultation] : lungs were clear to auscultation bilaterally [Normal PMI] : the apical impulse was normal [Normal Rate] : heart rate was normal  [Normal S1, S2] : normal S1 and S2 [Regular Rhythm] : with a regular rhythm [No Rubs] : no pericardial rub [Murmurs] : no murmurs [No Edema] : there was no peripheral edema [Not Tender] : non-tender [Normal Bowel Sounds] : normal bowel sounds [Soft] : abdomen soft [Not Distended] : not distended [Post Cervical Nodes] : posterior cervical nodes [Anterior Cervical Nodes] : anterior cervical nodes [Supraclavicular Nodes] : supraclavicular nodes [Normal] : normal and non tender [No Spinal Tenderness] : no spinal tenderness [Spine Straight] : spine straight [No Stigmata of Cushings Syndrome] : no stigmata of cushings syndrome [Normal Strength/Tone] : muscle strength and tone were normal [Normal Gait] : normal gait [No Involuntary Movements] : no involuntary movements were seen [No Skin Lesions] : no skin lesions [No Rash] : no rash [Normal Reflexes] : deep tendon reflexes were 2+ and symmetric [No Tremors] : no tremors [Oriented x3] : oriented to person, place, and time [Normal Insight/Judgement] : insight and judgment were intact [Normal Mood] : the mood was normal [Normal Affect] : the affect was normal [Acanthosis Nigricans] : no acanthosis nigricans

## 2020-02-03 NOTE — CONSULT LETTER
[Please see my note below.] : Please see my note below. [Courtesy Letter:] : I had the pleasure of seeing your patient, [unfilled], in my office today. [Consult Closing:] : Thank you very much for allowing me to participate in the care of this patient.  If you have any questions, please do not hesitate to contact me. [FreeTextEntry3] : Ashley Manzano [Sincerely,] : Sincerely,

## 2020-02-03 NOTE — HISTORY OF PRESENT ILLNESS
[FreeTextEntry1] : 38 year old male with PMH of seasonal allergies presents with suppressed TSH from PCP.\par On initial presentation in July 2019: TSH <0.01, free T4 2.7, T3 uptake 33, total T4 8.3. Oct 2019: TSH <0.01, free T4 1.3, Total T3 164, TPO + 76.7 and TSI positive 10.40. Jan 2020 TSH <0.01, FT4 1.6, TT3 178.\par RAIU scan done showed uptake of 29%: normal gland vs subacute thyroiditis.\par \par He is feeling well. Still reports sleep issues, but otherwise denies palpations, N/V/D, tremors, anxiety, throat swelling, hoarseness or difficulty swallowing. 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 \par FHx: Thinks is sister had hypothyroidism.

## 2020-02-12 ENCOUNTER — APPOINTMENT (OUTPATIENT)
Dept: ENDOCRINOLOGY | Facility: CLINIC | Age: 40
End: 2020-02-12

## 2020-03-16 DIAGNOSIS — E05.00 THYROTOXICOSIS WITH DIFFUSE GOITER W/OUT THYROTOXIC CRISIS OR STORM: ICD-10-CM

## 2020-03-16 LAB
T3 SERPL-MCNC: 137 NG/DL
T4 FREE SERPL-MCNC: 1.3 NG/DL
TSH SERPL-ACNC: 0.01 UIU/ML
TSI ACT/NOR SER: 11.6 IU/L

## 2020-05-01 ENCOUNTER — APPOINTMENT (OUTPATIENT)
Dept: ENDOCRINOLOGY | Facility: CLINIC | Age: 40
End: 2020-05-01

## 2020-05-22 ENCOUNTER — APPOINTMENT (OUTPATIENT)
Dept: ENDOCRINOLOGY | Facility: CLINIC | Age: 40
End: 2020-05-22

## 2020-06-19 ENCOUNTER — APPOINTMENT (OUTPATIENT)
Dept: ENDOCRINOLOGY | Facility: CLINIC | Age: 40
End: 2020-06-19

## 2020-07-08 ENCOUNTER — APPOINTMENT (OUTPATIENT)
Dept: ENDOCRINOLOGY | Facility: CLINIC | Age: 40
End: 2020-07-08

## 2020-07-09 LAB
25(OH)D3 SERPL-MCNC: 42.1 NG/ML
ALBUMIN SERPL ELPH-MCNC: 5.2 G/DL
ALP BLD-CCNC: 51 U/L
ALT SERPL-CCNC: 25 U/L
ANION GAP SERPL CALC-SCNC: 13 MMOL/L
AST SERPL-CCNC: 24 U/L
BASOPHILS # BLD AUTO: 0.05 K/UL
BASOPHILS NFR BLD AUTO: 1 %
BILIRUB SERPL-MCNC: 0.3 MG/DL
BUN SERPL-MCNC: 14 MG/DL
CALCIUM SERPL-MCNC: 10.5 MG/DL
CHLORIDE SERPL-SCNC: 104 MMOL/L
CHOLEST SERPL-MCNC: 198 MG/DL
CHOLEST/HDLC SERPL: 4.4 RATIO
CO2 SERPL-SCNC: 24 MMOL/L
CREAT SERPL-MCNC: 1.11 MG/DL
EOSINOPHIL # BLD AUTO: 0.18 K/UL
EOSINOPHIL NFR BLD AUTO: 3.5 %
ESTIMATED AVERAGE GLUCOSE: 105 MG/DL
GLUCOSE SERPL-MCNC: 79 MG/DL
HBA1C MFR BLD HPLC: 5.3 %
HCT VFR BLD CALC: 42.6 %
HDLC SERPL-MCNC: 45 MG/DL
HGB BLD-MCNC: 13.6 G/DL
IMM GRANULOCYTES NFR BLD AUTO: 0.4 %
LDLC SERPL CALC-MCNC: 115 MG/DL
LYMPHOCYTES # BLD AUTO: 2.15 K/UL
LYMPHOCYTES NFR BLD AUTO: 41.7 %
MAN DIFF?: NORMAL
MCHC RBC-ENTMCNC: 29.8 PG
MCHC RBC-ENTMCNC: 31.9 GM/DL
MCV RBC AUTO: 93.4 FL
MONOCYTES # BLD AUTO: 0.57 K/UL
MONOCYTES NFR BLD AUTO: 11.1 %
NEUTROPHILS # BLD AUTO: 2.18 K/UL
NEUTROPHILS NFR BLD AUTO: 42.3 %
PLATELET # BLD AUTO: 296 K/UL
POTASSIUM SERPL-SCNC: 4.5 MMOL/L
PROT SERPL-MCNC: 7.5 G/DL
RBC # BLD: 4.56 M/UL
RBC # FLD: 13.7 %
SODIUM SERPL-SCNC: 142 MMOL/L
T3 SERPL-MCNC: 91 NG/DL
T4 FREE SERPL-MCNC: 1 NG/DL
TRIGL SERPL-MCNC: 187 MG/DL
TSH SERPL-ACNC: 0.17 UIU/ML
WBC # FLD AUTO: 5.15 K/UL

## 2021-04-26 ENCOUNTER — EMERGENCY (EMERGENCY)
Facility: HOSPITAL | Age: 41
LOS: 1 days | Discharge: HOME | End: 2021-04-26
Admitting: EMERGENCY MEDICINE
Payer: COMMERCIAL

## 2021-04-26 VITALS
RESPIRATION RATE: 16 BRPM | DIASTOLIC BLOOD PRESSURE: 83 MMHG | WEIGHT: 125 LBS | TEMPERATURE: 98 F | OXYGEN SATURATION: 99 % | SYSTOLIC BLOOD PRESSURE: 130 MMHG | HEART RATE: 84 BPM

## 2021-04-26 DIAGNOSIS — S93.402A SPRAIN OF UNSPECIFIED LIGAMENT OF LEFT ANKLE, INITIAL ENCOUNTER: ICD-10-CM
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
## 2021-04-26 DIAGNOSIS — M25.572 PAIN IN LEFT ANKLE AND JOINTS OF LEFT FOOT: ICD-10-CM

## 2021-04-26 PROCEDURE — 73590 X-RAY EXAM OF LOWER LEG: CPT | Mod: 26,LT

## 2021-04-26 PROCEDURE — 73610 X-RAY EXAM OF ANKLE: CPT | Mod: 26,LT

## 2021-04-26 PROCEDURE — 99284 EMERGENCY DEPT VISIT MOD MDM: CPT | Mod: 25

## 2021-04-26 PROCEDURE — 73610 X-RAY EXAM OF ANKLE: CPT

## 2021-04-26 PROCEDURE — 73590 X-RAY EXAM OF LOWER LEG: CPT

## 2021-04-26 PROCEDURE — 99283 EMERGENCY DEPT VISIT LOW MDM: CPT | Mod: 25

## 2021-04-26 RX ORDER — IBUPROFEN 200 MG
600 TABLET ORAL ONCE
Refills: 0 | Status: COMPLETED | OUTPATIENT
Start: 2021-04-26 | End: 2021-04-26

## 2021-04-26 RX ADMIN — Medication 600 MILLIGRAM(S): at 22:39

## 2021-04-26 NOTE — ED ADULT NURSE NOTE - CHPI ED NUR SYMPTOMS NEG
no abrasion/no back pain/no deformity/no difficulty bearing weight/no fever/no numbness/no stiffness/no tingling/no weakness

## 2021-04-26 NOTE — ED ADULT TRIAGE NOTE - BP NONINVASIVE SYSTOLIC (MM HG)
130 Called by Rn to bedside for mild erythema to trunk after dose of vancomycin. Patient states he has had vancomycin in the past with no reactions. No periorbital edema, SOB, or tongue swelling. Next dose given at slower rate. Rash worsened with erythematous plaques to abdomen, shoulders, and proximal upper extremities with pruritis. No periorbital edema, SOB, or tongue swelling. Benadryl ordered. Vancomycin discontinued. Nurse to notify Dr. Effie Mehta at 0700 about reaction for Abx adjustments. Patient counseled to notify Rn if tongue swelling, SOB, periorbital edema appears.

## 2021-04-26 NOTE — ED PROVIDER NOTE - PATIENT PORTAL LINK FT
You can access the FollowMyHealth Patient Portal offered by [facility] by registering at the following website: [email or web address]. By joining [employer]’s FollowMyHealth portal, you will also be able to view your health information using other applications (apps) compatible with our system.

## 2021-04-26 NOTE — ED PROVIDER NOTE - PHYSICAL EXAMINATION
CONSTITUTIONAL: Well-appearing; well-nourished; in no apparent distress.   HEAD: Normocephalic; atraumatic.   EYES: PERRL; EOM intact; conjunctiva and sclera clear  RESPIRATORY: Breathing easily;   MSK: L ankle- +swelling, +tenderness over lateral malleolus, +tenderness to distal lateral leg   EXT: No cyanosis or edema; N/V intact  SKIN: Normal for age and race; warm; dry; good turgor; no apparent lesions or rash.

## 2021-04-26 NOTE — ED ADULT NURSE NOTE - NS ED NURSE LEVEL OF CONSCIOUSNESS MENTAL STATUS
Pre-Injection Information: Vital signs entered., Allergies reviewed as required for injection type., Pt states feeling well, no complaints., Pt denies signs and symptoms of infection. and Authorization completed if applicable.    Refer to MAR (medication administration record) for type of injection and medication given. (Procrit and Zarxio, Hgb 8.5)  Needle Size: 25 g. 5/8\" and kit  Patient tolerated well: Stable     Dr. Donovan Lara   is supervising clinician today.   Awake/Alert/Cooperative

## 2021-04-26 NOTE — ED ADULT NURSE NOTE - CHPI ED NUR SEVERITY2
Have Your Skin Lesions Been Treated?: not been treated Is This A New Presentation, Or A Follow-Up?: Skin Lesions How Severe Is Your Skin Lesion?: mild PAIN SCALE 5 OF 10.

## 2021-04-26 NOTE — ED PROVIDER NOTE - CLINICAL SUMMARY MEDICAL DECISION MAKING FREE TEXT BOX
39 yo m with no pmh c/o L ankle pain s/p trip 2 days ago. [place] step and inverted his ankle and fell. No head trauma. Pt able to walk with some discomfort. Reports swelling. Denies numbness, tingling. L ankle- +swelling, +tenderness over lateral malleolus, +tenderness to distal lateral leg 39 yo m with no pmh c/o L ankle pain s/p trip 2 days ago. [place] and [place] the last step and inverted his ankle and fell. No head trauma. Pt able to walk with some discomfort. Reports swelling. Denies numbness, tingling. L ankle- +swelling, +tenderness over lateral malleolus, +tenderness to distal lateral leg. Xr neg for fx, pt given ace, advised RICE.

## 2021-04-26 NOTE — ED PROVIDER NOTE - OBJECTIVE STATEMENT
39 yo m with no pmh c/o L ankle pain s/p trip 2 days ago. [place] last step and inverted his ankle and fell. No head trauma. Pt able to walk with some discomfort. Reports swelling. Denies numbness, tingling.

## 2021-04-26 NOTE — ED PROVIDER NOTE - NSFOLLOWUPINSTRUCTIONS_ED_ALL_ED_FT
Sprain    A sprain is a stretch or tear in one of the tough, fiber-like tissues (ligaments) in your body. This is caused by an injury to the area such as a twisting mechanism. Symptoms include pain, swelling, or bruising. Rest that area over the next several days and slowly resume activity when tolerated. Ice can help with swelling and pain.     SEEK IMMEDIATE MEDICAL CARE IF YOU HAVE ANY OF THE FOLLOWING SYMPTOMS: worsening pain, inability to move that body part, numbness or tingling.      R.I.C.E. Treatment    WHAT YOU NEED TO KNOW:    R.I.C.E. treatment is a 4-step process used to decrease swelling and pain caused by an injury. R.I.C.E. stands for rest, ice, compression, and elevation. R.I.C.E. should be done within 24 to 48 hours after an injury.    Ice and Elevation       Ice and Elevation         DISCHARGE INSTRUCTIONS:    Return to the emergency department if:   •Your pain is severe.      •You have severe swelling or deformity.      •You have numbness in the injured area.      Contact your healthcare provider if:   •Your pain and swelling does not go away after a few days.      •You have questions or concerns about your condition or care.      How to use R.I.C.E. treatment:   •Rest your injured area as directed. You may need to stop using, or keep weight off, the injury for 48 hours or longer. Your healthcare provider may recommend crutches or another device. Return to your usual activities as directed.       •Apply ice on your injured area for 15 to 20 minutes every 4 hours or as directed. Use an ice pack, or put crushed ice in a plastic bag. Cover it with a towel. Ice helps prevent tissue damage and decreases swelling and pain.      •Compress, or keep pressure on, the injured area. Compression will help decrease swelling and support the injured area. Use an elastic bandage, air stirrup, splint, or sling as directed. If you use an elastic bandage to wrap your injured area, make sure the bandage is not too tight.       •Elevate the injured area above the level of your heart as often as you can. This will help decrease swelling and pain. Prop the injured area on pillows or blankets to keep it elevated comfortably.       Follow up with your healthcare provider as directed: Write down your questions so you remember to ask them during your visits.

## 2021-04-26 NOTE — ED ADULT NURSE NOTE - OBJECTIVE STATEMENT
PT came to ED complaining of left foot pain. Pt states he twisted his left foot and had mechanical [place] [employer]. Pt presents with swelling, bruising to left dorsal and lateral foot and ankle.  Pt able to ambulate with pain, reduced ROM due to pain.  Positive PMS x4 extremities, Pt denies LOC, head trauma, all other injuries at this time. Alert and oriented x3.

## 2021-04-26 NOTE — ED ADULT NURSE NOTE - NSIMPLEMENTINTERV_GEN_ALL_ED
Implemented All Fall Risk Interventions:  [place] to call system. Call bell, personal items and telephone within reach. Instruct patient to call for assistance. Room bathroom lighting operational. Non-slip footwear when patient is off stretcher. Physically safe environment: no spills, clutter or unnecessary equipment. Stretcher in lowest position, wheels locked, appropriate side rails in place. Provide visual cue, wrist band, yellow gown, etc. Monitor gait and stability. Monitor for mental status changes and reorient to person, place, and time. Review medications for side effects contributing to fall risk. Reinforce activity limits and safety measures with patient and family.

## 2021-05-10 ENCOUNTER — TRANSCRIPTION ENCOUNTER (OUTPATIENT)
Age: 41
End: 2021-05-10

## 2021-06-15 NOTE — ED ADULT TRIAGE NOTE - NS ED TRIAGE EKG
Peripheral Block    Patient location during procedure: pre-op  Start time: 1/23/2018 6:59 AM  End time: 1/23/2018 7:03 AM  post-op analgesia per surgeon order as noted in medical record  Staffing:  Performing  Anesthesiologist: BISMARK BUSH  Preanesthetic Checklist  Completed: patient identified, site marked, risks, benefits, and alternatives discussed, timeout performed, consent obtained, airway assessed, oxygen available, suction available, emergency drugs available and hand hygiene performed  Peripheral Block  Block type: saphenous, adductor canal block  Prep: ChloraPrep  Patient position: supine  Patient monitoring: cardiac monitor, continuous pulse oximetry, blood pressure and heart rate  Laterality: left  Injection technique: ultrasound guided    Ultrasound used to visualize needle placement in proximity to nerve being blocked: yes     Sterile gel and probe cover used for ultrasound.    Needle  Needle type: Stimuplex   Needle gauge: 22 G  Needle length: 4 in  no peripheral nerve catheter placed  Assessment  Injection assessment: no difficulty with injection           EKG completed

## 2021-09-05 NOTE — ED PROVIDER NOTE - HIV OFFER
Opt out PAST SURGICAL HISTORY:  H/O right hemicolectomy     History of Laminectomy/ Fusion 1/06; L1-L2

## 2021-10-07 NOTE — ED ADULT NURSE NOTE - NSIMPLEMENTINTERV_GEN_ALL_ED
Pharmacy Pharmacotherapy Consult for LOS >30 days    Admit Date: 6/4/2021      Medications were reviewed for appropriateness and ongoing need.     Current Facility-Administered Medications   Medication Dose Route Frequency Provider Last Rate Last Admin   • MD ALERT...Covid-19 Vaccine Order   Other PHARMACY TO DOSE Jericho Rowley M.D.       • terbinafine (LAMISIL) tablet 250 mg  250 mg Oral DAILY Fito Sommer M.D.   250 mg at 10/07/21 0733   • thiamine (Vitamin B-1) tablet 100 mg  100 mg Oral DAILY Scott Salas A.P.R.N.   100 mg at 10/07/21 0733   • multivitamin (THERAGRAN) tablet 1 tablet  1 Tablet Oral DAILY Scott Salas A.P.R.N.   1 Tablet at 10/07/21 0733   • acetaminophen (Tylenol) tablet 650 mg  650 mg Oral Q6HRS PRN Jojo Castillo M.D.   650 mg at 08/09/21 1619       Recommendations:  Recommend discontinuation of loperamide, Zofran, and simethicone as patient has not required any administration of medication. Also recommended repeat CMP to monitor LFT while on long-term terbinafine.     Jeni Sterling, PharmD       Implemented All Universal Safety Interventions:  [place] to call system. Call bell, personal items and telephone within reach. Instruct patient to call for assistance. Room bathroom lighting operational. Non-slip footwear when patient is off stretcher. Physically safe environment: no spills, clutter or unnecessary equipment. Stretcher in lowest position, wheels locked, appropriate side rails in place.

## 2022-02-02 ENCOUNTER — EMERGENCY (EMERGENCY)
Facility: HOSPITAL | Age: 42
LOS: 1 days | Discharge: HOME | End: 2022-02-02
Attending: EMERGENCY MEDICINE | Admitting: EMERGENCY MEDICINE
Payer: COMMERCIAL

## 2022-02-02 VITALS
RESPIRATION RATE: 16 BRPM | TEMPERATURE: 98 F | DIASTOLIC BLOOD PRESSURE: 70 MMHG | SYSTOLIC BLOOD PRESSURE: 122 MMHG | OXYGEN SATURATION: 98 % | HEART RATE: 93 BPM | WEIGHT: 125 LBS | HEIGHT: 62 IN

## 2022-02-02 DIAGNOSIS — M54.50 LOW BACK PAIN, UNSPECIFIED: ICD-10-CM

## 2022-02-02 PROCEDURE — 99283 EMERGENCY DEPT VISIT LOW MDM: CPT | Mod: 25

## 2022-02-02 PROCEDURE — 96372 THER/PROPH/DIAG INJ SC/IM: CPT

## 2022-02-02 PROCEDURE — 99284 EMERGENCY DEPT VISIT MOD MDM: CPT

## 2022-02-02 RX ORDER — CYCLOBENZAPRINE HYDROCHLORIDE 10 MG/1
1 TABLET, FILM COATED ORAL
Qty: 15 | Refills: 0
Start: 2022-02-02 | End: 2022-02-06

## 2022-02-02 RX ORDER — DIAZEPAM 5 MG
2 TABLET ORAL ONCE
Refills: 0 | Status: DISCONTINUED | OUTPATIENT
Start: 2022-02-02 | End: 2022-02-02

## 2022-02-02 RX ORDER — IBUPROFEN 200 MG
1 TABLET ORAL
Qty: 30 | Refills: 0
Start: 2022-02-02

## 2022-02-02 RX ORDER — KETOROLAC TROMETHAMINE 30 MG/ML
30 SYRINGE (ML) INJECTION ONCE
Refills: 0 | Status: DISCONTINUED | OUTPATIENT
Start: 2022-02-02 | End: 2022-02-02

## 2022-02-02 RX ADMIN — Medication 30 MILLIGRAM(S): at 22:12

## 2022-02-02 RX ADMIN — Medication 2 MILLIGRAM(S): at 22:12

## 2022-02-02 RX ADMIN — Medication 30 MILLIGRAM(S): at 22:21

## 2022-02-02 NOTE — ED ADULT TRIAGE NOTE - CHIEF COMPLAINT QUOTE
Patient presents to ER ambulatory with steady gait with chief complaints of Low back pain x 3 days. Denies injury/fall.

## 2022-02-02 NOTE — ED PROVIDER NOTE - CLINICAL SUMMARY MEDICAL DECISION MAKING FREE TEXT BOX
Patient presents to ED with concern for low back pain over the past several days.  No paresthesias, no extremity weakness, no radiation of pain in LEs, no loss of bowel/bladder, no saddle anesthesia.  Patient given low dose muscle relaxant, toradol in ED and is feeling improved.  He is offered spine follow up information and encouraged to also follow up with his PCP for further evaluation and treatment.  Patient is advised to follow up with their PCP in 1-2 days without fail.  Patient instructed to return to ED immediately should their symptoms worsen or if there is any concern prior to the recommended PCP follow up.  Patient is aware of plan and verbalizes their understanding.  Will discharge home at this time.

## 2022-02-02 NOTE — ED ADULT NURSE NOTE - OBJECTIVE STATEMENT
Patient c/o of lower back pain X 5 days, no numbness/tingling, no neuro deficits, able to ambulate w/ steady gait.  Denies any injury or fall.

## 2022-02-02 NOTE — ED ADULT NURSE REASSESSMENT NOTE - NS ED NURSE REASSESS COMMENT FT1
Valium 2mg PO, Toradol 30mg IM adminsitered for back pain.  No new symptom complaint.  Vital signs stable.  Discharged to home in stable condition.

## 2022-02-02 NOTE — ED ADULT NURSE NOTE - NSIMPLEMENTINTERV_GEN_ALL_ED
Implemented All Universal Safety Interventions:  [place] to call system. Call bell, personal items and telephone within reach. Instruct patient to call for assistance. Room bathroom lighting operational. Non-slip footwear when patient is off stretcher. Physically safe environment: no spills, clutter or unnecessary equipment. Stretcher in lowest position, wheels locked, appropriate side rails in place.

## 2022-02-02 NOTE — ED PROVIDER NOTE - PHYSICAL EXAMINATION
VITAL SIGNS: I have reviewed nursing notes and confirm.  CONSTITUTIONAL: Non toxic; in no acute distress.   SKIN:  Warm and dry, no acute rash.   HEAD:  Normocephalic, atraumatic.  EYES: EOM intact; conjunctiva and sclera clear.  ENT: No nasal discharge; airway clear.   NECK: Supple; no midline cervical spine pain/tenderness/stepoff/deformity.   CARD: S1, S2 normal; no murmurs, gallops, or rubs. Regular rate and rhythm.   RESP:  Clear to auscultation b/l, no wheezes, rales or rhonchi.  ABD: Normal bowel sounds; soft; non-distended; non-tender; no guarding/rebound.  MSK:  + TTP over bilateral low lumbar paraspinals.  No midline thoracic or lumbar spine pain/tenderness/stepoff/deformity.   EXT: Normal ROM. No clubbing, cyanosis or edema. 2+ pulses to b/l ue/le.  NEURO: Alert, oriented, grossly unremarkable.  5/5 strength x 4 extremities against gravity and external force.  Sensation intact and symmetric x 4 extremities.  No facial asymmetry.    PSYCH: Cooperative, mood and affect appropriate.

## 2022-02-02 NOTE — ED ADULT TRIAGE NOTE - BSA (M2)
Ambulatory Care Malaika Gomez MD [facility] AM[facility]
1.57

## 2022-02-02 NOTE — ED PROVIDER NOTE - NSFOLLOWUPINSTRUCTIONS_ED_ALL_ED_FT
Please follow up with your primary physician in 1-2 days for re evaluation.  Please return to ER immediately should your symptoms worsen or if you have any concern prior to this recommended follow up.          Low Back Strain    WHAT YOU NEED TO KNOW:    Low back strain is an injury to your lower back muscles or tendons. Tendons are strong tissues that connect muscles to bones. The lower back supports most of your body weight and helps you move, twist, and bend.    DISCHARGE INSTRUCTIONS:    Return to the emergency department if:   •You hear or feel a pop in your lower back.      •You have increased swelling or pain in your lower back.      •You have trouble moving your legs.      •Your legs are numb.      Call your doctor if:   •You have a fever.      •Your pain does not go away, even after treatment.      •You have questions or concerns about your condition or care.      Medicines: The following medicines may be ordered by your healthcare provider:  •Acetaminophen decreases pain and fever. It is available without a doctor's order. Ask how much to take and how often to take it. Follow directions. Read the labels of all other medicines you are using to see if they also contain acetaminophen, or ask your doctor or pharmacist. Acetaminophen can cause liver damage if not taken correctly. Do not use more than 4 grams (4,000 milligrams) total of acetaminophen in one day.       •NSAIDs, such as ibuprofen, help decrease swelling, pain, and fever. This medicine is available with or without a doctor's order. NSAIDs can cause stomach bleeding or kidney problems in certain people. If you take blood thinner medicine, always ask your healthcare provider if NSAIDs are safe for you. Always read the medicine label and follow directions.      •Muscle relaxers help decrease pain and muscle spasms.      •Prescription pain medicine may be given. Ask your healthcare provider how to take this medicine safely. Some prescription pain medicines contain acetaminophen. Do not take other medicines that contain acetaminophen without talking to your healthcare provider. Too much acetaminophen may cause liver damage. Prescription pain medicine may cause constipation. Ask your healthcare provider how to prevent or treat constipation.       •Take your medicine as directed. Contact your healthcare provider if you think your medicine is not helping or if you have side effects. Tell him or her if you are allergic to any medicine. Keep a list of the medicines, vitamins, and herbs you take. Include the amounts, and when and why you take them. Bring the list or the pill bottles to follow-up visits. Carry your medicine list with you in case of an emergency.      Self-care:   •Rest as directed. You may need to rest in bed for a period of time after your injury. Do not lift heavy objects.      •Apply ice on your back for 15 to 20 minutes every hour or as directed. Use an ice pack, or put crushed ice in a plastic bag. Cover it with a towel. Ice helps prevent tissue damage and decreases swelling and pain.      •Apply heat on your lower back for 20 to 30 minutes every 2 hours for as many days as directed. Heat helps decrease pain and muscle spasms.      •Slowly start to increase your activity as the pain decreases, or as directed.      Prevent another low back strain:   •Use correct body movements. ?Bend at the hips and knees when you  objects. Do not bend from the waist. Use your leg muscles as you lift the load. Do not use your back. Keep the object close to your chest as you lift it. Try not to twist or lift anything above your waist.  How to Lift Items Safely           ?Change your position often when you stand for long periods of time. Rest one foot on a small box or footrest, and then switch to the other foot often.      ?Try not to sit for long periods of time. When you do, sit in a straight-backed chair with your feet flat on the floor.      ?Never reach, pull, or push while you are sitting.      •Warm up before you exercise. Do exercises that strengthen your back muscles. Ask your healthcare provider about the best exercise plan for you.  Warm up and Cool Down            •Maintain a healthy weight. Ask your healthcare provider how much you should weigh. Ask him to help you create a weight loss plan if you are overweight.         © Copyright [employer] 2022           back to top                          © Copyright [employer] 2022

## 2022-02-02 NOTE — ED PROVIDER NOTE - OBJECTIVE STATEMENT
41 year old male with history of season allergies presents to ED with concern for low back pain x 4 days.  Patient notes pain to bilateral low lumbar paraspinals, stating he has not taken any medication for his symptoms since onset.  Patient notes his wife is in ED to be evaluated and he also wanted to check in.  He denies associated paresthesias, extremity weakness, radiation of pain into extremities, loss of bowel/bladder function, urinary retention, saddle anesthesia, midline spine pain, back injury, heavy lifting he can recall, fever, chills, headache, visual changes, chest pain, shortness of breath, abdominal pain, nausea, emesis, changes to bowel movements, peripheral edema, calf pain/tenderness, recent travel, known sick contacts or any additional acute complaints or concerns at this time.

## 2022-02-02 NOTE — ED PROVIDER NOTE - CARE PROVIDER_API CALL
Ammon Guillen)  Orthopedics  [address] [address]  Phone: [phone]  Fax: [phone]  Follow Up Time:

## 2022-02-02 NOTE — ED PROVIDER NOTE - PATIENT PORTAL LINK FT
You can access the FollowMyHealth Patient Portal offered by [facility] by registering at the following website: [email or web address]. By joining [employer]’s FollowMyHealth portal, you will also be able to view your health information using other applications (apps) compatible with our system.

## 2023-09-14 NOTE — ED PROVIDER NOTE - WR ORDER STATUS 1
Pt coming to ER with complaints of blurry vision, dizziness, and left sided headache since yesterday. Denies chest pain/SOB. BEFAST negative in triage.   
Performed

## 2023-10-03 ENCOUNTER — EMERGENCY (EMERGENCY)
Facility: HOSPITAL | Age: 43
LOS: 1 days | Discharge: HOME | End: 2023-10-03
Admitting: STUDENT IN AN ORGANIZED HEALTH CARE EDUCATION/TRAINING PROGRAM
Payer: COMMERCIAL

## 2023-10-03 VITALS
RESPIRATION RATE: 18 BRPM | HEART RATE: 70 BPM | TEMPERATURE: 99 F | DIASTOLIC BLOOD PRESSURE: 80 MMHG | SYSTOLIC BLOOD PRESSURE: 124 MMHG | OXYGEN SATURATION: 97 %

## 2023-10-03 DIAGNOSIS — H57.12 OCULAR PAIN, LEFT EYE: ICD-10-CM

## 2023-10-03 PROCEDURE — 99283 EMERGENCY DEPT VISIT LOW MDM: CPT

## 2023-10-04 RX ORDER — FLUORESCEIN SODIUM 9 MG
1 STRIP OPHTHALMIC (EYE) ONCE
Refills: 0 | Status: COMPLETED | OUTPATIENT
Start: 2023-10-04 | End: 2023-10-04

## 2023-10-04 RX ADMIN — Medication 1 DROP(S): at 03:07

## 2023-10-04 RX ADMIN — Medication 1 APPLICATION(S): at 03:06

## 2023-10-04 NOTE — ED PROVIDER NOTE - PATIENT PORTAL LINK FT
You can access the FollowMyHealth Patient Portal offered by [facility] by registering at the following website: [email or web address]. By joining [employer]’s FollowMyHealth portal, you will also be able to view your health information using other applications (apps) compatible with our system.

## 2023-10-04 NOTE — ED PROVIDER NOTE - OBJECTIVE STATEMENT
42 yr old male, denies medical hx, presents to the Emergency Department w eye pain. pt has had sensation that something is stuck in the eye. left eye, feels like it is in the lower / outer part of his eye. does not remember anything going in the eye. no vision changes. no trauma to the eye. does not wear contacts or glasses.

## 2023-10-04 NOTE — ED PROVIDER NOTE - CLINICAL SUMMARY MEDICAL DECISION MAKING FREE TEXT BOX
denies medical hx, here w sensation that something is stuck in left lower eye. no trauma, does not remember a foreign body. no vision changes.   pt well appearing, stable vitals, exam unremarkable - no redness or drainage from eye. no flurescein uptake on exam  no corneal abrasion, no foreign body visualized  stable for dc - recommend outpt ophtho follow up

## 2023-10-04 NOTE — ED PROVIDER NOTE - PHYSICAL EXAMINATION
Constitutional : Well appearing, non-toxic, no acute distress. awake, alert, oriented to person, place, time/situation.  Head : head normocephalic, atraumatic  EENMT : eyes clear bilaterally, PERRL, EOMI. no fluorescein uptake. no conjunctival injection or tearing.    airway patent. moist mucous membranes. neck supple.  Cardiac : Extremities warm and well perfused. 2+ radial and DP pulses. cap refill <2 seconds. no LE edema.  Resp : Respirations even and unlabored.   MSK :  range of motion is not limited, no muscle or joint tenderness  Back : No evidence of trauma. No spinal or CVA tenderness.  Neuro : Alert and oriented, CNII-XII grossly intact, no focal deficits, no motor or sensory deficits.  Skin : Skin normal color for race, warm, dry and intact. No evidence of rash.  Psych : Alert and oriented to person, place, time/situation. normal mood and affect. no apparent risk to self or others.

## 2023-10-04 NOTE — ED ADULT NURSE NOTE - OBJECTIVE STATEMENT
Pt presents to ED c/o eye pain/injury x4 days. Reports feeling like something is in his left eye. Pt presents to ED c/o eye pain/injury x4 days. Reports feeling like something is in his left eye. No blurry or double vision reported

## 2023-10-04 NOTE — ED PROVIDER NOTE - NSFOLLOWUPCLINICS_GEN_ALL_ED_FT
[facility] - Ophthalmology Clinic  Ophthalmology  [address], [address] [place] [address]  Phone: [phone]  Fax:     [facility]  Ophthalmology  [address] [place] [id number]  Phone: [phone]  Fax:

## 2023-10-04 NOTE — ED ADULT NURSE REASSESSMENT NOTE - NS ED NURSE REASSESS COMMENT FT1
Informed by primary nurse that pt left department, stating "I have been waiting here too long". No direct observation of pt made by this RN.

## 2023-10-04 NOTE — ED PROVIDER NOTE - NSFOLLOWUPINSTRUCTIONS_ED_ALL_ED_FT
Follow up with OPHTHALMOLOGY tomorrow for continued management.   See office information here.     Return for any new or worsening symptoms including fever, dizziness, blurry vision or any other concerning symptoms.

## 2023-10-06 NOTE — ED ADULT NURSE NOTE - NS ED NURSE DISCH DISPOSITION
To be completed in Nursing note:    Please reference list for forms that require a visit for completion.  Please remind patients that providers are given 3-5 business days to complete and return forms.      Form type:  [employer]    Date form received: 10/03/23    Date form completed by Physician:10/06/23    How was form returned to patient (mailed, faxed, or at  for patient to ):  Faxed to   Date form mailed/faxed/left at  for patient and sent to HIM for scanning:  10/06/23, sent to MR for scanning    Once form is left for patient, faxed, or mailed PCS will then close the documentation only encounter.     
Discharged

## 2024-11-24 ENCOUNTER — EMERGENCY (EMERGENCY)
Facility: HOSPITAL | Age: 44
LOS: 1 days | Discharge: HOME | End: 2024-11-24
Attending: STUDENT IN AN ORGANIZED HEALTH CARE EDUCATION/TRAINING PROGRAM | Admitting: STUDENT IN AN ORGANIZED HEALTH CARE EDUCATION/TRAINING PROGRAM
Payer: COMMERCIAL

## 2024-11-24 VITALS
SYSTOLIC BLOOD PRESSURE: 146 MMHG | OXYGEN SATURATION: 98 % | RESPIRATION RATE: 18 BRPM | TEMPERATURE: 98 F | DIASTOLIC BLOOD PRESSURE: 85 MMHG | HEART RATE: 66 BPM

## 2024-11-24 VITALS
HEART RATE: 78 BPM | TEMPERATURE: 98 F | RESPIRATION RATE: 16 BRPM | DIASTOLIC BLOOD PRESSURE: 61 MMHG | HEIGHT: 64 IN | WEIGHT: 134.92 LBS | OXYGEN SATURATION: 96 % | SYSTOLIC BLOOD PRESSURE: 132 MMHG

## 2024-11-24 LAB
ALBUMIN SERPL ELPH-MCNC: 4.7 G/DL — SIGNIFICANT CHANGE UP (ref 3.3–5)
ALP SERPL-CCNC: 48 U/L — SIGNIFICANT CHANGE UP (ref 40–120)
ALT FLD-CCNC: 20 U/L — SIGNIFICANT CHANGE UP (ref 10–45)
ANION GAP SERPL CALC-SCNC: 11 MMOL/L — SIGNIFICANT CHANGE UP (ref 5–17)
APPEARANCE UR: CLEAR — SIGNIFICANT CHANGE UP
AST SERPL-CCNC: 20 U/L — SIGNIFICANT CHANGE UP (ref 10–40)
BASOPHILS # BLD AUTO: 0.05 K/UL — SIGNIFICANT CHANGE UP (ref 0–0.2)
BASOPHILS NFR BLD AUTO: 0.7 % — SIGNIFICANT CHANGE UP (ref 0–2)
BILIRUB SERPL-MCNC: 0.2 MG/DL — SIGNIFICANT CHANGE UP (ref 0.2–1.2)
BILIRUB UR-MCNC: NEGATIVE — SIGNIFICANT CHANGE UP
BUN SERPL-MCNC: 18 MG/DL — SIGNIFICANT CHANGE UP (ref 7–23)
CALCIUM SERPL-MCNC: 9.8 MG/DL — SIGNIFICANT CHANGE UP (ref 8.4–10.5)
CHLORIDE SERPL-SCNC: 102 MMOL/L — SIGNIFICANT CHANGE UP (ref 96–108)
CO2 SERPL-SCNC: 27 MMOL/L — SIGNIFICANT CHANGE UP (ref 22–31)
COLOR SPEC: YELLOW — SIGNIFICANT CHANGE UP
CREAT SERPL-MCNC: 0.98 MG/DL — SIGNIFICANT CHANGE UP (ref 0.5–1.3)
DIFF PNL FLD: NEGATIVE — SIGNIFICANT CHANGE UP
EGFR: 97 ML/MIN/1.73M2 — SIGNIFICANT CHANGE UP
EOSINOPHIL # BLD AUTO: 0.24 K/UL — SIGNIFICANT CHANGE UP (ref 0–0.5)
EOSINOPHIL NFR BLD AUTO: 3.3 % — SIGNIFICANT CHANGE UP (ref 0–6)
GLUCOSE SERPL-MCNC: 93 MG/DL — SIGNIFICANT CHANGE UP (ref 70–99)
GLUCOSE UR QL: NEGATIVE MG/DL — SIGNIFICANT CHANGE UP
HCT VFR BLD CALC: 38.9 % — LOW (ref 39–50)
HGB BLD-MCNC: 12.8 G/DL — LOW (ref 13–17)
IMM GRANULOCYTES NFR BLD AUTO: 0.3 % — SIGNIFICANT CHANGE UP (ref 0–0.9)
KETONES UR-MCNC: NEGATIVE MG/DL — SIGNIFICANT CHANGE UP
LEUKOCYTE ESTERASE UR-ACNC: NEGATIVE — SIGNIFICANT CHANGE UP
LIDOCAIN IGE QN: 26 U/L — SIGNIFICANT CHANGE UP (ref 7–60)
LYMPHOCYTES # BLD AUTO: 2.11 K/UL — SIGNIFICANT CHANGE UP (ref 1–3.3)
LYMPHOCYTES # BLD AUTO: 28.9 % — SIGNIFICANT CHANGE UP (ref 13–44)
MCHC RBC-ENTMCNC: 30 PG — SIGNIFICANT CHANGE UP (ref 27–34)
MCHC RBC-ENTMCNC: 32.9 G/DL — SIGNIFICANT CHANGE UP (ref 32–36)
MCV RBC AUTO: 91.3 FL — SIGNIFICANT CHANGE UP (ref 80–100)
MONOCYTES # BLD AUTO: 0.75 K/UL — SIGNIFICANT CHANGE UP (ref 0–0.9)
MONOCYTES NFR BLD AUTO: 10.3 % — SIGNIFICANT CHANGE UP (ref 2–14)
NEUTROPHILS # BLD AUTO: 4.14 K/UL — SIGNIFICANT CHANGE UP (ref 1.8–7.4)
NEUTROPHILS NFR BLD AUTO: 56.5 % — SIGNIFICANT CHANGE UP (ref 43–77)
NITRITE UR-MCNC: NEGATIVE — SIGNIFICANT CHANGE UP
NRBC # BLD: 0 /100 WBCS — SIGNIFICANT CHANGE UP (ref 0–0)
PH UR: 6.5 — SIGNIFICANT CHANGE UP (ref 5–8)
PLATELET # BLD AUTO: 280 K/UL — SIGNIFICANT CHANGE UP (ref 150–400)
POTASSIUM SERPL-MCNC: 4 MMOL/L — SIGNIFICANT CHANGE UP (ref 3.5–5.3)
POTASSIUM SERPL-SCNC: 4 MMOL/L — SIGNIFICANT CHANGE UP (ref 3.5–5.3)
PROT SERPL-MCNC: 7.2 G/DL — SIGNIFICANT CHANGE UP (ref 6–8.3)
PROT UR-MCNC: NEGATIVE MG/DL — SIGNIFICANT CHANGE UP
RBC # BLD: 4.26 M/UL — SIGNIFICANT CHANGE UP (ref 4.2–5.8)
RBC # FLD: 13.2 % — SIGNIFICANT CHANGE UP (ref 10.3–14.5)
SODIUM SERPL-SCNC: 140 MMOL/L — SIGNIFICANT CHANGE UP (ref 135–145)
SP GR SPEC: 1.01 — SIGNIFICANT CHANGE UP (ref 1–1.03)
UROBILINOGEN FLD QL: 0.2 MG/DL — SIGNIFICANT CHANGE UP (ref 0.2–1)
WBC # BLD: 7.31 K/UL — SIGNIFICANT CHANGE UP (ref 3.8–10.5)
WBC # FLD AUTO: 7.31 K/UL — SIGNIFICANT CHANGE UP (ref 3.8–10.5)

## 2024-11-24 PROCEDURE — 85025 COMPLETE CBC W/AUTO DIFF WBC: CPT

## 2024-11-24 PROCEDURE — 36415 COLL VENOUS BLD VENIPUNCTURE: CPT

## 2024-11-24 PROCEDURE — 80053 COMPREHEN METABOLIC PANEL: CPT

## 2024-11-24 PROCEDURE — 99284 EMERGENCY DEPT VISIT MOD MDM: CPT | Mod: 25

## 2024-11-24 PROCEDURE — 96374 THER/PROPH/DIAG INJ IV PUSH: CPT

## 2024-11-24 PROCEDURE — 81003 URINALYSIS AUTO W/O SCOPE: CPT

## 2024-11-24 PROCEDURE — 83690 ASSAY OF LIPASE: CPT

## 2024-11-24 PROCEDURE — 99284 EMERGENCY DEPT VISIT MOD MDM: CPT

## 2024-11-24 RX ORDER — SODIUM CHLORIDE 9 MG/ML
1000 INJECTION, SOLUTION INTRAMUSCULAR; INTRAVENOUS; SUBCUTANEOUS ONCE
Refills: 0 | Status: COMPLETED | OUTPATIENT
Start: 2024-11-24 | End: 2024-11-24

## 2024-11-24 RX ORDER — KETOROLAC TROMETHAMINE 30 MG/ML
15 INJECTION INTRAMUSCULAR; INTRAVENOUS ONCE
Refills: 0 | Status: DISCONTINUED | OUTPATIENT
Start: 2024-11-24 | End: 2024-11-24

## 2024-11-24 RX ADMIN — SODIUM CHLORIDE 1000 MILLILITER(S): 9 INJECTION, SOLUTION INTRAMUSCULAR; INTRAVENOUS; SUBCUTANEOUS at 21:03

## 2024-11-24 RX ADMIN — KETOROLAC TROMETHAMINE 15 MILLIGRAM(S): 30 INJECTION INTRAMUSCULAR; INTRAVENOUS at 21:24

## 2024-11-24 NOTE — ED PROVIDER NOTE - NSFOLLOWUPINSTRUCTIONS_ED_ALL_ED_FT
Follow up with your primary care doctor   Take ibuprofen 600mg every 6 hours as needed for pain    Abdominal Pain    Many things can cause abdominal pain. Many times, abdominal pain is not caused by a disease and will improve without treatment. Your health care provider will do a physical exam to determine if there is a dangerous cause of your pain; blood tests and imaging may help determine the cause of your pain. However, in many cases, no cause may be found and you may need further testing as an outpatient. Monitor your abdominal pain for any changes.     SEEK IMMEDIATE MEDICAL CARE IF YOU HAVE ANY OF THE FOLLOWING SYMPTOMS: worsening abdominal pain, uncontrollable vomiting, profuse diarrhea, inability to have bowel movements or pass gas, black or bloody stools, fever accompanying chest pain or back pain, or fainting. These symptoms may represent a serious problem that is an emergency. Do not wait to see if the symptoms will go away. Get medical help right away. Call 911 and do not drive yourself to the hospital.

## 2024-11-24 NOTE — ED PROVIDER NOTE - OBJECTIVE STATEMENT
45 yo M with no pmh c/o RUQ pain x 3 days. Worse with a deep breath or touching the area. Denies fever, chills, n/v, cp, sob, dysuria, hematuria, back pain. No black/bloody stools. Tried pepcid with no relief. No previous abd surgery. No etoh use. 45 yo M with no pmh c/o R side pain x 3 days. Worse with a deep breath, touching the area or bending over. Denies trauma.  Denies fever, chills, n/v, cp, sob, dysuria, hematuria, back pain. No black/bloody stools. Tried pepcid with no relief. No previous abd surgery. No etoh use.

## 2024-11-24 NOTE — ED PROVIDER NOTE - PHYSICAL EXAMINATION
CONSTITUTIONAL: Well-appearing;  in no apparent distress.   HEAD: Normocephalic; atraumatic.   EYES: PERRL; EOM intact; conjunctiva and sclera clear  ENT: normal nose; no rhinorrhea; normal pharynx with no erythema or lesions.   NECK: Supple; non-tender; no LAD  CARDIOVASCULAR: Normal S1, S2; No audible murmurs. Regular rate and rhythm.   RESPIRATORY: Breathing easily; breath sounds clear and equal bilaterally; no wheezes, rhonchi, or rales.  GI: Soft; non-distended;   MSK: FROM at all extremities, normal tone   EXT: No cyanosis or edema; N/V intact  SKIN: Normal for age and race; warm; dry; good turgor; no apparent lesions or rash. CONSTITUTIONAL: Well-appearing;  in no apparent distress.   HEAD: Normocephalic; atraumatic.   EYES: PERRL; EOM intact; conjunctiva and sclera clear  ENT: normal nose; no rhinorrhea; normal pharynx with no erythema or lesions.   NECK: Supple; non-tender; no LAD  CARDIOVASCULAR: Normal S1, S2; No audible murmurs. Regular rate and rhythm.   RESPIRATORY: Breathing easily; breath sounds clear and equal bilaterally; no wheezes, rhonchi, or rales.  GI: Soft; non-distended; neg murphys, mild tenderness to R oblique muscles   MSK: FROM at all extremities, normal tone   EXT: No cyanosis or edema; N/V intact  SKIN: Normal for age and race; warm; dry; good turgor; no apparent lesions or rash.

## 2024-11-24 NOTE — ED PROVIDER NOTE - NSDCPRINTRESULTS_ED_ALL_ED
[Palencia.  
Patient requests all Lab, Cardiology, and Radiology Results on their Discharge Instructions

## 2024-11-24 NOTE — ED ADULT NURSE NOTE - OBJECTIVE STATEMENT
Pt A&Ox4 presents to ED with complaints of RUQ abdominal pain x 3 days. Denies chest pain, shortness of breath, fevers/chills, n/v, urinary symptoms, bloody stools. Denies pmhx.

## 2024-11-24 NOTE — ED ADULT NURSE NOTE - NS ED NURSE RECORD ANOTHER VITAL SIGN
Yes
Pt will increase static/dynamic standing balance to WFL to perform all functional mobility without LOB, by 5 weeks.

## 2024-11-24 NOTE — ED PROVIDER NOTE - CLINICAL SUMMARY MEDICAL DECISION MAKING FREE TEXT BOX
43 yo M with no pmh c/o RUQ pain x 3 days. Worse with a deep breath or touching the area. Denies fever, chills, n/v, cp, sob, dysuria, hematuria, back pain. No black/bloody stools. 43 yo M with no pmh c/o  R side pain x 3 days. Worse with a deep breath or touching the area. Denies fever, chills, n/v, cp, sob, dysuria, hematuria, back pain. No black/bloody stools. VSS. Lungs cta b/l. Soft; non-distended; neg murphys, mild tenderness to R oblique muscles. No CVAT. Labs wnl, normal lipase/lfts/urine. doubt cholecystitis or kidney stone. Advised NSAIDs. possible msk pain. pt will f/u with his pcp. return precautions discussed 43 yo M with no pmh c/o  R side pain x 3 days. Worse with a deep breath or touching the area. Denies fever, chills, n/v, cp, sob, dysuria, hematuria, back pain. No black/bloody stools. VSS. Lungs cta b/l. Soft; non-distended; neg murphys, mild tenderness to R oblique muscles. No CVAT.  PERC neg. Labs wnl, normal lipase/lfts/urine. doubt cholecystitis or kidney stone. Advised NSAIDs. possible msk pain. pt will f/u with his pcp. return precautions discussed

## 2024-11-24 NOTE — ED PROVIDER NOTE - ATTENDING APP SHARED VISIT CONTRIBUTION OF CARE
45 yo M with no pmh c/o R side pain x 3 days. minimal epigastric ttp, will check labs, ua, meds, reassess

## 2024-11-24 NOTE — ED PROVIDER NOTE - PATIENT PORTAL LINK FT
You can access the FollowMyHealth Patient Portal offered by [facility] by registering at the following website: [email or web address]. By joining [employer]’s FollowMyHealth portal, you will also be able to view your health information using other applications (apps) compatible with our system.

## 2024-11-27 DIAGNOSIS — R10.9 UNSPECIFIED ABDOMINAL PAIN: ICD-10-CM

## 2024-11-27 DIAGNOSIS — R10.816 EPIGASTRIC ABDOMINAL TENDERNESS: ICD-10-CM

## 2025-03-26 NOTE — ED ADULT NURSE NOTE - TEMPLATE
[facility]  [facility]IMARY [facility]ARE  [address] [place] [id number]  Dept: [phone]  Dept Fax: [phone]     3/26/2025      Jesus BALDERRAMA Vinay   Eduin Bobby MD  Cold/Sinus